# Patient Record
Sex: MALE | Race: WHITE | NOT HISPANIC OR LATINO
[De-identification: names, ages, dates, MRNs, and addresses within clinical notes are randomized per-mention and may not be internally consistent; named-entity substitution may affect disease eponyms.]

---

## 2019-02-28 ENCOUNTER — APPOINTMENT (OUTPATIENT)
Dept: HEART AND VASCULAR | Facility: CLINIC | Age: 59
End: 2019-02-28

## 2019-03-01 VITALS
RESPIRATION RATE: 16 BRPM | WEIGHT: 216.93 LBS | HEART RATE: 57 BPM | SYSTOLIC BLOOD PRESSURE: 117 MMHG | HEIGHT: 70 IN | OXYGEN SATURATION: 96 % | DIASTOLIC BLOOD PRESSURE: 75 MMHG

## 2019-03-01 RX ORDER — UBIDECARENONE 100 MG
1 CAPSULE ORAL
Qty: 0 | Refills: 0 | COMMUNITY

## 2019-03-01 RX ORDER — CHOLECALCIFEROL (VITAMIN D3) 125 MCG
1 CAPSULE ORAL
Qty: 0 | Refills: 0 | COMMUNITY

## 2019-03-01 RX ORDER — ROSUVASTATIN CALCIUM 5 MG/1
1 TABLET ORAL
Qty: 0 | Refills: 0 | COMMUNITY

## 2019-03-01 RX ORDER — ASPIRIN/CALCIUM CARB/MAGNESIUM 324 MG
1 TABLET ORAL
Qty: 0 | Refills: 0 | COMMUNITY

## 2019-03-01 NOTE — H&P ADULT - ASSESSMENT
60 y/o active M w/ PMHX HLD, Possible h/o MVP, who presented to their cardiologist for routine evaluation, notes ESPARZA on climbing 10 flights of stairs (CCS Angina Equivalent Class 2 Sx). 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 Echo in 10/2017 showed EF > 52%, ascending aorta 3.9cm, (4.8cm from sinuses), mild MR, trace TR. NST in 2/2019 showed proximal and mid anterior wall moderate-sized area of mild ischemia, EF normal. Pt denies CP, dizziness, diaphoresis, palpitations, LE edema, orthopnea, PND, syncope. Due to pts risk factors, CCS Angina Equivalent Class 2 Sx, abnormal NST / calcium score, pt is referred for cardiac catheterization w/ possible intervention.     Pt's last dose of Aspirin 81mg daily was on 3/3/19. ASA III. Mallampati III.

## 2019-03-01 NOTE — H&P ADULT - HISTORY OF PRESENT ILLNESS
58 y/o active M w/ PMHX HLD, Possible h/o MVP, who presented to their cardiologist for routine evaluation, notes ESPARZA on climbing 10 flights of stairs (CCS Angina Equivalent Class 2 Sx). 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 Echo in 10/2017 showed EF > 52%, ascending aorta 3.9cm, (4.8cm from sinuses), mild MR, trace TR. NST in 2/2019 showed proximal and mid anterior wall moderate-sized area of mild ischemia, EF normal. Pt denies CP, dizziness, diaphoresis, palpitations, LE edema, orthopnea, PND, syncope    Due to pts risk factors, CCS Angina Equivalent Class 2 Sx, abnormal NST / calcium score, pt is referred for cardiac catheterization w/ possible intervention. 60 y/o active M w/ PMHX HLD, Possible h/o MVP, who presented to their cardiologist for routine evaluation, notes ESPARZA on climbing 10 flights of stairs (CCS Angina Equivalent Class 2 Sx). 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 Echo in 10/2017 showed EF > 52%, ascending aorta 3.9cm, (4.8cm from sinuses), mild MR, trace TR. NST in 2/2019 showed proximal and mid anterior wall moderate-sized area of mild ischemia, EF normal. Pt denies CP, dizziness, diaphoresis, palpitations, LE edema, orthopnea, PND, syncope.    Due to pts risk factors, CCS Angina Equivalent Class 2 Sx, abnormal NST / calcium score, pt is referred for cardiac catheterization w/ possible intervention.

## 2019-03-01 NOTE — H&P ADULT - NEGATIVE CARDIOVASCULAR SYMPTOMS
no palpitations/no chest pain/no orthopnea/no paroxysmal nocturnal dyspnea/no peripheral edema/no claudication

## 2019-03-04 ENCOUNTER — OUTPATIENT (OUTPATIENT)
Dept: OUTPATIENT SERVICES | Facility: HOSPITAL | Age: 59
LOS: 1 days | Discharge: HOME | End: 2019-03-04
Payer: COMMERCIAL

## 2019-03-04 DIAGNOSIS — Z98.890 OTHER SPECIFIED POSTPROCEDURAL STATES: Chronic | ICD-10-CM

## 2019-03-04 LAB
ALBUMIN SERPL ELPH-MCNC: 4.7 G/DL — SIGNIFICANT CHANGE UP (ref 3.3–5)
ALP SERPL-CCNC: 59 U/L — SIGNIFICANT CHANGE UP (ref 40–120)
ALT FLD-CCNC: 25 U/L — SIGNIFICANT CHANGE UP (ref 10–45)
ANION GAP SERPL CALC-SCNC: 16 MMOL/L — SIGNIFICANT CHANGE UP (ref 5–17)
APTT BLD: 30.8 SEC — SIGNIFICANT CHANGE UP (ref 27.5–36.3)
AST SERPL-CCNC: 21 U/L — SIGNIFICANT CHANGE UP (ref 10–40)
BASOPHILS # BLD AUTO: 0.02 K/UL — SIGNIFICANT CHANGE UP (ref 0–0.2)
BASOPHILS NFR BLD AUTO: 0.5 % — SIGNIFICANT CHANGE UP (ref 0–2)
BILIRUB SERPL-MCNC: 0.5 MG/DL — SIGNIFICANT CHANGE UP (ref 0.2–1.2)
BUN SERPL-MCNC: 19 MG/DL — SIGNIFICANT CHANGE UP (ref 7–23)
CALCIUM SERPL-MCNC: 9.6 MG/DL — SIGNIFICANT CHANGE UP (ref 8.4–10.5)
CHLORIDE SERPL-SCNC: 106 MMOL/L — SIGNIFICANT CHANGE UP (ref 96–108)
CHOLEST SERPL-MCNC: 141 MG/DL — SIGNIFICANT CHANGE UP (ref 10–199)
CK MB CFR SERPL CALC: 1.7 NG/ML — SIGNIFICANT CHANGE UP (ref 0–6.7)
CK SERPL-CCNC: 89 U/L — SIGNIFICANT CHANGE UP (ref 30–200)
CO2 SERPL-SCNC: 25 MMOL/L — SIGNIFICANT CHANGE UP (ref 22–31)
CREAT SERPL-MCNC: 1.04 MG/DL — SIGNIFICANT CHANGE UP (ref 0.5–1.3)
CRP SERPL-MCNC: 0.06 MG/DL — SIGNIFICANT CHANGE UP (ref 0–0.4)
EOSINOPHIL # BLD AUTO: 0.13 K/UL — SIGNIFICANT CHANGE UP (ref 0–0.5)
EOSINOPHIL NFR BLD AUTO: 3 % — SIGNIFICANT CHANGE UP (ref 0–6)
GLUCOSE SERPL-MCNC: 102 MG/DL — HIGH (ref 70–99)
HBA1C BLD-MCNC: 5.2 % — SIGNIFICANT CHANGE UP (ref 4–5.6)
HCT VFR BLD CALC: 41.5 % — SIGNIFICANT CHANGE UP (ref 39–50)
HDLC SERPL-MCNC: 54 MG/DL — SIGNIFICANT CHANGE UP
HGB BLD-MCNC: 14 G/DL — SIGNIFICANT CHANGE UP (ref 13–17)
IMM GRANULOCYTES NFR BLD AUTO: 0.2 % — SIGNIFICANT CHANGE UP (ref 0–1.5)
INR BLD: 1.06 — SIGNIFICANT CHANGE UP (ref 0.88–1.16)
LIPID PNL WITH DIRECT LDL SERPL: 75 MG/DL — SIGNIFICANT CHANGE UP
LYMPHOCYTES # BLD AUTO: 1.18 K/UL — SIGNIFICANT CHANGE UP (ref 1–3.3)
LYMPHOCYTES # BLD AUTO: 27.1 % — SIGNIFICANT CHANGE UP (ref 13–44)
MCHC RBC-ENTMCNC: 31.9 PG — SIGNIFICANT CHANGE UP (ref 27–34)
MCHC RBC-ENTMCNC: 33.7 GM/DL — SIGNIFICANT CHANGE UP (ref 32–36)
MCV RBC AUTO: 94.5 FL — SIGNIFICANT CHANGE UP (ref 80–100)
MONOCYTES # BLD AUTO: 0.45 K/UL — SIGNIFICANT CHANGE UP (ref 0–0.9)
MONOCYTES NFR BLD AUTO: 10.3 % — SIGNIFICANT CHANGE UP (ref 2–14)
NEUTROPHILS # BLD AUTO: 2.57 K/UL — SIGNIFICANT CHANGE UP (ref 1.8–7.4)
NEUTROPHILS NFR BLD AUTO: 58.9 % — SIGNIFICANT CHANGE UP (ref 43–77)
NRBC # BLD: 0 /100 WBCS — SIGNIFICANT CHANGE UP (ref 0–0)
PLATELET # BLD AUTO: 184 K/UL — SIGNIFICANT CHANGE UP (ref 150–400)
POTASSIUM SERPL-MCNC: 4.3 MMOL/L — SIGNIFICANT CHANGE UP (ref 3.5–5.3)
POTASSIUM SERPL-SCNC: 4.3 MMOL/L — SIGNIFICANT CHANGE UP (ref 3.5–5.3)
PROT SERPL-MCNC: 7.6 G/DL — SIGNIFICANT CHANGE UP (ref 6–8.3)
PROTHROM AB SERPL-ACNC: 12 SEC — SIGNIFICANT CHANGE UP (ref 10–12.9)
RBC # BLD: 4.39 M/UL — SIGNIFICANT CHANGE UP (ref 4.2–5.8)
RBC # FLD: 11.9 % — SIGNIFICANT CHANGE UP (ref 10.3–14.5)
SODIUM SERPL-SCNC: 147 MMOL/L — HIGH (ref 135–145)
TOTAL CHOLESTEROL/HDL RATIO MEASUREMENT: 2.6 RATIO — LOW (ref 3.4–9.6)
TRIGL SERPL-MCNC: 62 MG/DL — SIGNIFICANT CHANGE UP (ref 10–149)
TROPONIN T SERPL-MCNC: <0.01 NG/ML — SIGNIFICANT CHANGE UP (ref 0–0.01)
WBC # BLD: 4.36 K/UL — SIGNIFICANT CHANGE UP (ref 3.8–10.5)
WBC # FLD AUTO: 4.36 K/UL — SIGNIFICANT CHANGE UP (ref 3.8–10.5)

## 2019-03-04 PROCEDURE — 93005 ELECTROCARDIOGRAM TRACING: CPT

## 2019-03-04 PROCEDURE — 85730 THROMBOPLASTIN TIME PARTIAL: CPT

## 2019-03-04 PROCEDURE — 93458 L HRT ARTERY/VENTRICLE ANGIO: CPT

## 2019-03-04 PROCEDURE — C1887: CPT

## 2019-03-04 PROCEDURE — 85025 COMPLETE CBC W/AUTO DIFF WBC: CPT

## 2019-03-04 PROCEDURE — 80061 LIPID PANEL: CPT

## 2019-03-04 PROCEDURE — 83036 HEMOGLOBIN GLYCOSYLATED A1C: CPT

## 2019-03-04 PROCEDURE — 93458 L HRT ARTERY/VENTRICLE ANGIO: CPT | Mod: 26

## 2019-03-04 PROCEDURE — 82553 CREATINE MB FRACTION: CPT

## 2019-03-04 PROCEDURE — C1894: CPT

## 2019-03-04 PROCEDURE — 84484 ASSAY OF TROPONIN QUANT: CPT

## 2019-03-04 PROCEDURE — 80053 COMPREHEN METABOLIC PANEL: CPT

## 2019-03-04 PROCEDURE — 82550 ASSAY OF CK (CPK): CPT

## 2019-03-04 PROCEDURE — 93010 ELECTROCARDIOGRAM REPORT: CPT

## 2019-03-04 PROCEDURE — 85610 PROTHROMBIN TIME: CPT

## 2019-03-04 PROCEDURE — C1769: CPT

## 2019-03-04 PROCEDURE — 86140 C-REACTIVE PROTEIN: CPT

## 2019-03-04 RX ORDER — ASPIRIN/CALCIUM CARB/MAGNESIUM 324 MG
325 TABLET ORAL ONCE
Qty: 0 | Refills: 0 | Status: COMPLETED | OUTPATIENT
Start: 2019-03-04 | End: 2019-03-04

## 2019-03-04 RX ORDER — SODIUM CHLORIDE 9 MG/ML
500 INJECTION INTRAMUSCULAR; INTRAVENOUS; SUBCUTANEOUS
Qty: 0 | Refills: 0 | Status: DISCONTINUED | OUTPATIENT
Start: 2019-03-04 | End: 2019-03-04

## 2019-03-04 RX ORDER — SODIUM CHLORIDE 9 MG/ML
500 INJECTION INTRAMUSCULAR; INTRAVENOUS; SUBCUTANEOUS
Qty: 0 | Refills: 0 | Status: DISCONTINUED | OUTPATIENT
Start: 2019-03-04 | End: 2019-03-19

## 2019-03-04 RX ORDER — CLOPIDOGREL BISULFATE 75 MG/1
600 TABLET, FILM COATED ORAL ONCE
Qty: 0 | Refills: 0 | Status: COMPLETED | OUTPATIENT
Start: 2019-03-04 | End: 2019-03-04

## 2019-03-04 RX ADMIN — CLOPIDOGREL BISULFATE 600 MILLIGRAM(S): 75 TABLET, FILM COATED ORAL at 09:38

## 2019-03-04 RX ADMIN — Medication 325 MILLIGRAM(S): at 09:38

## 2019-03-04 RX ADMIN — SODIUM CHLORIDE 75 MILLILITER(S): 9 INJECTION INTRAMUSCULAR; INTRAVENOUS; SUBCUTANEOUS at 09:38

## 2019-03-04 NOTE — PROGRESS NOTE ADULT - SUBJECTIVE AND OBJECTIVE BOX
Procedure: LHC, Radial band  Indication: UA, CAD, +EST  Complication: none    Result:  1) Non-obstructive CAD  2) Normal LV function, EF 65%, LVEDP 20        Plan: Medical management. D/C home - to f/u with Dr. David Mabry.

## 2019-03-04 NOTE — PROGRESS NOTE ADULT - SUBJECTIVE AND OBJECTIVE BOX
Interventional Cardiology PA SDA Discharge Note    No complaints.   Afebrile, VSS    Ext:  R Radial:   no hematoma, no bleeding, radial pulse 1+, radial band in place    A/P:  60 y/o M s/p cardiac cath today revealing non-obstructive CAD, normal EF    Continue ASA/Statin.     Stable for discharge as per attending Dr. Lazo after bed rest, pt voids, wrist remains stable, and 30 min. after ambulation.  Follow-up with Cardiologist Dr. David Mabry in 1 week  Discharge forms signed and copies in chart

## 2019-03-06 DIAGNOSIS — R94.39 ABNORMAL RESULT OF OTHER CARDIOVASCULAR FUNCTION STUDY: ICD-10-CM

## 2019-03-06 DIAGNOSIS — I25.110 ATHEROSCLEROTIC HEART DISEASE OF NATIVE CORONARY ARTERY WITH UNSTABLE ANGINA PECTORIS: ICD-10-CM

## 2019-03-06 DIAGNOSIS — E66.9 OBESITY, UNSPECIFIED: ICD-10-CM

## 2019-03-06 DIAGNOSIS — K21.9 GASTRO-ESOPHAGEAL REFLUX DISEASE WITHOUT ESOPHAGITIS: ICD-10-CM

## 2019-03-06 DIAGNOSIS — E78.5 HYPERLIPIDEMIA, UNSPECIFIED: ICD-10-CM

## 2019-03-06 DIAGNOSIS — Z88.0 ALLERGY STATUS TO PENICILLIN: ICD-10-CM

## 2019-03-18 ENCOUNTER — RECORD ABSTRACTING (OUTPATIENT)
Age: 59
End: 2019-03-18

## 2019-03-18 ENCOUNTER — APPOINTMENT (OUTPATIENT)
Dept: HEART AND VASCULAR | Facility: CLINIC | Age: 59
End: 2019-03-18
Payer: COMMERCIAL

## 2019-03-18 VITALS
BODY MASS INDEX: 32.43 KG/M2 | DIASTOLIC BLOOD PRESSURE: 80 MMHG | SYSTOLIC BLOOD PRESSURE: 116 MMHG | HEART RATE: 68 BPM | RESPIRATION RATE: 16 BRPM | WEIGHT: 224 LBS | HEIGHT: 69.5 IN

## 2019-03-18 DIAGNOSIS — I51.7 CARDIOMEGALY: ICD-10-CM

## 2019-03-18 DIAGNOSIS — Z86.39 PERSONAL HISTORY OF OTHER ENDOCRINE, NUTRITIONAL AND METABOLIC DISEASE: ICD-10-CM

## 2019-03-18 DIAGNOSIS — Z86.010 PERSONAL HISTORY OF COLONIC POLYPS: ICD-10-CM

## 2019-03-18 DIAGNOSIS — K21.9 GASTRO-ESOPHAGEAL REFLUX DISEASE W/OUT ESOPHAGITIS: ICD-10-CM

## 2019-03-18 DIAGNOSIS — Z82.3 FAMILY HISTORY OF STROKE: ICD-10-CM

## 2019-03-18 DIAGNOSIS — Z82.5 FAMILY HISTORY OF ASTHMA AND OTHER CHRONIC LOWER RESPIRATORY DISEASES: ICD-10-CM

## 2019-03-18 DIAGNOSIS — Z00.00 ENCOUNTER FOR GENERAL ADULT MEDICAL EXAMINATION W/OUT ABNORMAL FINDINGS: ICD-10-CM

## 2019-03-18 DIAGNOSIS — S83.249A OTHER TEAR OF MEDIAL MENISCUS, CURRENT INJURY, UNSPECIFIED KNEE, INITIAL ENCOUNTER: ICD-10-CM

## 2019-03-18 DIAGNOSIS — Z78.9 OTHER SPECIFIED HEALTH STATUS: ICD-10-CM

## 2019-03-18 PROCEDURE — 99204 OFFICE O/P NEW MOD 45 MIN: CPT

## 2019-03-18 RX ORDER — SILDENAFIL 20 MG/1
20 TABLET ORAL
Refills: 0 | Status: ACTIVE | COMMUNITY

## 2019-03-18 RX ORDER — MULTIVITAMIN
TABLET ORAL
Refills: 0 | Status: ACTIVE | COMMUNITY

## 2019-03-18 RX ORDER — UBIDECARENONE/VIT E ACET 100MG-5
50 MCG CAPSULE ORAL
Refills: 0 | Status: ACTIVE | COMMUNITY

## 2019-03-18 RX ORDER — ROSUVASTATIN CALCIUM 5 MG/1
5 TABLET, FILM COATED ORAL
Qty: 30 | Refills: 11 | Status: DISCONTINUED | COMMUNITY
End: 2019-03-18

## 2019-03-18 NOTE — PHYSICAL EXAM
[General Appearance - Well Developed] : well developed [Normal Appearance] : normal appearance [Well Groomed] : well groomed [General Appearance - Well Nourished] : well nourished [No Deformities] : no deformities [Normal Conjunctiva] : the conjunctiva exhibited no abnormalities [General Appearance - In No Acute Distress] : no acute distress [Eyelids - No Xanthelasma] : the eyelids demonstrated no xanthelasmas [Normal Oral Mucosa] : normal oral mucosa [No Oral Pallor] : no oral pallor [No Oral Cyanosis] : no oral cyanosis [Normal Jugular Venous A Waves Present] : normal jugular venous A waves present [Normal Jugular Venous V Waves Present] : normal jugular venous V waves present [No Jugular Venous Klein A Waves] : no jugular venous klein A waves [Heart Rate And Rhythm] : heart rate and rhythm were normal [Heart Sounds] : normal S1 and S2 [Murmurs] : no murmurs present [Respiration, Rhythm And Depth] : normal respiratory rhythm and effort [Exaggerated Use Of Accessory Muscles For Inspiration] : no accessory muscle use [Auscultation Breath Sounds / Voice Sounds] : lungs were clear to auscultation bilaterally [Abdomen Soft] : soft [Abdomen Tenderness] : non-tender [Abdomen Mass (___ Cm)] : no abdominal mass palpated [Abnormal Walk] : normal gait [Gait - Sufficient For Exercise Testing] : the gait was sufficient for exercise testing [Nail Clubbing] : no clubbing of the fingernails [Cyanosis, Localized] : no localized cyanosis [Petechial Hemorrhages (___cm)] : no petechial hemorrhages [Skin Color & Pigmentation] : normal skin color and pigmentation [] : no rash [No Venous Stasis] : no venous stasis [Skin Lesions] : no skin lesions [No Skin Ulcers] : no skin ulcer [No Xanthoma] : no  xanthoma was observed

## 2019-03-18 NOTE — DISCUSSION/SUMMARY
[Coronary Artery Disease] : coronary artery disease [Dietary Modification] : dietary modification [Exercise Regimen] : an exercise regimen [Hyperlipidemia] : hyperlipidemia [Diet Modification] : diet modification [Exercise] : exercise [Mild Mitral Regurgitation] : mild mitral regurgitation [Compensated] : compensated [Sodium Restriction] : sodium restriction [Stable] : stable [None] : none [Exercise Rehab] : exercise rehabilitation [Patient] : the patient [de-identified] : TAA 4.1 cm

## 2019-03-18 NOTE — HISTORY OF PRESENT ILLNESS
[FreeTextEntry1] : Juwan Cheung is a 58 yo male who presents for CV evaluation.  In Feb 2019, he was sent for EBCT which revealed elevated calcium score and mild dilatation of the thoracic aorta.  He was sent for stress perfusion study interpreted to be abnl.  He was scheduled to undergo cath but insurance was an issue.  He was referred to me.  Records were reviewed.  Cardiac cath took place at [facility].  Cath revealed nonobstructive disease.\par \par His recovery has been unremarkable.  Results were reviewed in detail.\par \par He denies cp, sob, pnd, orthopnea, edema, palp, or loc.\par \par He is active and compliant with meds.\par \par Meds reviewed, Mediterranean diet discussed, and exercise encouraged.

## 2019-03-18 NOTE — REASON FOR VISIT
[Initial Evaluation] : an initial evaluation of [Coronary Artery Disease] : coronary artery disease [Hyperlipidemia] : hyperlipidemia [Mitral Regurgitation] : mitral regurgitation [Peripheral Vascular Disease] : peripheral vascular disease

## 2019-03-19 LAB
ALBUMIN SERPL ELPH-MCNC: 4.5 G/DL
ALP BLD-CCNC: 55 U/L
ALT SERPL-CCNC: 28 U/L
ANION GAP SERPL CALC-SCNC: 12 MMOL/L
AST SERPL-CCNC: 25 U/L
BASOPHILS # BLD AUTO: 0.03 K/UL
BASOPHILS NFR BLD AUTO: 0.5 %
BILIRUB SERPL-MCNC: 0.5 MG/DL
BUN SERPL-MCNC: 20 MG/DL
CALCIUM SERPL-MCNC: 10 MG/DL
CHLORIDE SERPL-SCNC: 105 MMOL/L
CHOLEST SERPL-MCNC: 166 MG/DL
CHOLEST/HDLC SERPL: 2.9 RATIO
CO2 SERPL-SCNC: 25 MMOL/L
CREAT SERPL-MCNC: 1.13 MG/DL
EOSINOPHIL # BLD AUTO: 0.16 K/UL
EOSINOPHIL NFR BLD AUTO: 2.5 %
GLUCOSE SERPL-MCNC: 97 MG/DL
HCT VFR BLD CALC: 44.9 %
HDLC SERPL-MCNC: 58 MG/DL
HGB BLD-MCNC: 14.3 G/DL
IMM GRANULOCYTES NFR BLD AUTO: 0.3 %
LDLC SERPL CALC-MCNC: 92 MG/DL
LYMPHOCYTES # BLD AUTO: 1.47 K/UL
LYMPHOCYTES NFR BLD AUTO: 23.4 %
MAN DIFF?: NORMAL
MCHC RBC-ENTMCNC: 30.5 PG
MCHC RBC-ENTMCNC: 31.8 GM/DL
MCV RBC AUTO: 95.7 FL
MONOCYTES # BLD AUTO: 0.47 K/UL
MONOCYTES NFR BLD AUTO: 7.5 %
NEUTROPHILS # BLD AUTO: 4.14 K/UL
NEUTROPHILS NFR BLD AUTO: 65.8 %
PLATELET # BLD AUTO: 220 K/UL
POTASSIUM SERPL-SCNC: 4.8 MMOL/L
PROT SERPL-MCNC: 7.7 G/DL
RBC # BLD: 4.69 M/UL
RBC # FLD: 12.5 %
SODIUM SERPL-SCNC: 142 MMOL/L
TRIGL SERPL-MCNC: 82 MG/DL
WBC # FLD AUTO: 6.29 K/UL

## 2019-03-20 ENCOUNTER — RESULT REVIEW (OUTPATIENT)
Age: 59
End: 2019-03-20

## 2019-03-21 ENCOUNTER — RESULT REVIEW (OUTPATIENT)
Age: 59
End: 2019-03-21

## 2019-06-27 ENCOUNTER — APPOINTMENT (OUTPATIENT)
Dept: HEART AND VASCULAR | Facility: CLINIC | Age: 59
End: 2019-06-27
Payer: COMMERCIAL

## 2019-06-27 ENCOUNTER — RESULT REVIEW (OUTPATIENT)
Age: 59
End: 2019-06-27

## 2019-06-27 VITALS
WEIGHT: 222 LBS | HEART RATE: 58 BPM | RESPIRATION RATE: 14 BRPM | SYSTOLIC BLOOD PRESSURE: 102 MMHG | BODY MASS INDEX: 32.14 KG/M2 | DIASTOLIC BLOOD PRESSURE: 68 MMHG | HEIGHT: 69.5 IN

## 2019-06-27 PROCEDURE — 99214 OFFICE O/P EST MOD 30 MIN: CPT

## 2019-06-27 NOTE — HISTORY OF PRESENT ILLNESS
[FreeTextEntry1] : Juwan Cheung returns for follow up.  He denies cp, sob, pnd, orthopnea, edema, palp, or loc.\par \par He is active and compliant with meds.\par \par Discussion regarding risk and benefits of aspirin therapy were reviewed.  He will continue to take aspirin.

## 2019-06-27 NOTE — DISCUSSION/SUMMARY
[Coronary Artery Disease] : coronary artery disease [Exercise Regimen] : an exercise regimen [Dietary Modification] : dietary modification [Hyperlipidemia] : hyperlipidemia [Diet Modification] : diet modification [Exercise] : exercise [Mild Mitral Regurgitation] : mild mitral regurgitation [Compensated] : compensated [Sodium Restriction] : sodium restriction [Stable] : stable [None] : none [Exercise Rehab] : exercise rehabilitation [Patient] : the patient [de-identified] : TAA 4.1 cm

## 2019-06-27 NOTE — REASON FOR VISIT
[Follow-Up - Clinic] : a clinic follow-up of [Coronary Artery Disease] : coronary artery disease [Hyperlipidemia] : hyperlipidemia [Mitral Regurgitation] : mitral regurgitation [Peripheral Vascular Disease] : peripheral vascular disease

## 2019-06-27 NOTE — PHYSICAL EXAM
[General Appearance - Well Developed] : well developed [Normal Appearance] : normal appearance [Well Groomed] : well groomed [General Appearance - Well Nourished] : well nourished [No Deformities] : no deformities [General Appearance - In No Acute Distress] : no acute distress [Normal Conjunctiva] : the conjunctiva exhibited no abnormalities [Eyelids - No Xanthelasma] : the eyelids demonstrated no xanthelasmas [Normal Oral Mucosa] : normal oral mucosa [No Oral Pallor] : no oral pallor [No Oral Cyanosis] : no oral cyanosis [Normal Jugular Venous A Waves Present] : normal jugular venous A waves present [Normal Jugular Venous V Waves Present] : normal jugular venous V waves present [No Jugular Venous Klein A Waves] : no jugular venous klein A waves [Respiration, Rhythm And Depth] : normal respiratory rhythm and effort [Exaggerated Use Of Accessory Muscles For Inspiration] : no accessory muscle use [Auscultation Breath Sounds / Voice Sounds] : lungs were clear to auscultation bilaterally [Heart Rate And Rhythm] : heart rate and rhythm were normal [Heart Sounds] : normal S1 and S2 [Murmurs] : no murmurs present [Abdomen Soft] : soft [Abdomen Tenderness] : non-tender [Abdomen Mass (___ Cm)] : no abdominal mass palpated [Abnormal Walk] : normal gait [Gait - Sufficient For Exercise Testing] : the gait was sufficient for exercise testing [Cyanosis, Localized] : no localized cyanosis [Nail Clubbing] : no clubbing of the fingernails [Petechial Hemorrhages (___cm)] : no petechial hemorrhages [] : no rash [Skin Color & Pigmentation] : normal skin color and pigmentation [No Venous Stasis] : no venous stasis [Skin Lesions] : no skin lesions [No Skin Ulcers] : no skin ulcer [No Xanthoma] : no  xanthoma was observed

## 2019-06-28 LAB
ALBUMIN SERPL ELPH-MCNC: 4.6 G/DL
ALP BLD-CCNC: 56 U/L
ALT SERPL-CCNC: 28 U/L
ANION GAP SERPL CALC-SCNC: 13 MMOL/L
AST SERPL-CCNC: 23 U/L
BASOPHILS # BLD AUTO: 0.02 K/UL
BASOPHILS NFR BLD AUTO: 0.3 %
BILIRUB SERPL-MCNC: 0.3 MG/DL
BUN SERPL-MCNC: 22 MG/DL
CALCIUM SERPL-MCNC: 9.9 MG/DL
CHLORIDE SERPL-SCNC: 105 MMOL/L
CHOLEST SERPL-MCNC: 160 MG/DL
CHOLEST/HDLC SERPL: 2.7 RATIO
CO2 SERPL-SCNC: 24 MMOL/L
CREAT SERPL-MCNC: 1.04 MG/DL
EOSINOPHIL # BLD AUTO: 0.2 K/UL
EOSINOPHIL NFR BLD AUTO: 3.5 %
GLUCOSE SERPL-MCNC: 101 MG/DL
HCT VFR BLD CALC: 45.5 %
HDLC SERPL-MCNC: 59 MG/DL
HGB BLD-MCNC: 14.1 G/DL
IMM GRANULOCYTES NFR BLD AUTO: 0.5 %
LDLC SERPL CALC-MCNC: 91 MG/DL
LYMPHOCYTES # BLD AUTO: 1.57 K/UL
LYMPHOCYTES NFR BLD AUTO: 27.3 %
MAN DIFF?: NORMAL
MCHC RBC-ENTMCNC: 30.9 PG
MCHC RBC-ENTMCNC: 31 GM/DL
MCV RBC AUTO: 99.6 FL
MONOCYTES # BLD AUTO: 0.53 K/UL
MONOCYTES NFR BLD AUTO: 9.2 %
NEUTROPHILS # BLD AUTO: 3.41 K/UL
NEUTROPHILS NFR BLD AUTO: 59.2 %
PLATELET # BLD AUTO: 196 K/UL
POTASSIUM SERPL-SCNC: 4.6 MMOL/L
PROT SERPL-MCNC: 7.6 G/DL
RBC # BLD: 4.57 M/UL
RBC # FLD: 13.2 %
SODIUM SERPL-SCNC: 142 MMOL/L
TRIGL SERPL-MCNC: 51 MG/DL
WBC # FLD AUTO: 5.76 K/UL

## 2019-09-06 ENCOUNTER — APPOINTMENT (OUTPATIENT)
Dept: ORTHOPEDIC SURGERY | Facility: CLINIC | Age: 59
End: 2019-09-06
Payer: COMMERCIAL

## 2019-09-06 VITALS — WEIGHT: 218 LBS | BODY MASS INDEX: 31.21 KG/M2 | HEIGHT: 70 IN

## 2019-09-06 DIAGNOSIS — Z86.39 PERSONAL HISTORY OF OTHER ENDOCRINE, NUTRITIONAL AND METABOLIC DISEASE: ICD-10-CM

## 2019-09-06 DIAGNOSIS — Z87.39 PERSONAL HISTORY OF OTHER DISEASES OF THE MUSCULOSKELETAL SYSTEM AND CONNECTIVE TISSUE: ICD-10-CM

## 2019-09-06 DIAGNOSIS — Z80.3 FAMILY HISTORY OF MALIGNANT NEOPLASM OF BREAST: ICD-10-CM

## 2019-09-06 PROCEDURE — 99204 OFFICE O/P NEW MOD 45 MIN: CPT

## 2019-10-15 ENCOUNTER — APPOINTMENT (OUTPATIENT)
Dept: ORTHOPEDIC SURGERY | Facility: CLINIC | Age: 59
End: 2019-10-15
Payer: COMMERCIAL

## 2019-10-15 VITALS — WEIGHT: 218 LBS | HEIGHT: 70 IN | BODY MASS INDEX: 31.21 KG/M2

## 2019-10-15 DIAGNOSIS — M77.11 LATERAL EPICONDYLITIS, RIGHT ELBOW: ICD-10-CM

## 2019-10-15 PROCEDURE — 99212 OFFICE O/P EST SF 10 MIN: CPT

## 2020-01-16 ENCOUNTER — APPOINTMENT (OUTPATIENT)
Dept: HEART AND VASCULAR | Facility: CLINIC | Age: 60
End: 2020-01-16
Payer: COMMERCIAL

## 2020-01-16 VITALS
BODY MASS INDEX: 32.78 KG/M2 | DIASTOLIC BLOOD PRESSURE: 82 MMHG | HEIGHT: 70 IN | RESPIRATION RATE: 14 BRPM | SYSTOLIC BLOOD PRESSURE: 132 MMHG | WEIGHT: 229 LBS | HEART RATE: 54 BPM

## 2020-01-16 PROCEDURE — 99214 OFFICE O/P EST MOD 30 MIN: CPT

## 2020-01-16 NOTE — PHYSICAL EXAM
[General Appearance - Well Developed] : well developed [Normal Appearance] : normal appearance [Well Groomed] : well groomed [No Deformities] : no deformities [General Appearance - Well Nourished] : well nourished [General Appearance - In No Acute Distress] : no acute distress [Normal Conjunctiva] : the conjunctiva exhibited no abnormalities [Eyelids - No Xanthelasma] : the eyelids demonstrated no xanthelasmas [Normal Oral Mucosa] : normal oral mucosa [No Oral Pallor] : no oral pallor [No Oral Cyanosis] : no oral cyanosis [Normal Jugular Venous A Waves Present] : normal jugular venous A waves present [Normal Jugular Venous V Waves Present] : normal jugular venous V waves present [No Jugular Venous Klein A Waves] : no jugular venous klein A waves [Respiration, Rhythm And Depth] : normal respiratory rhythm and effort [Exaggerated Use Of Accessory Muscles For Inspiration] : no accessory muscle use [Auscultation Breath Sounds / Voice Sounds] : lungs were clear to auscultation bilaterally [Heart Rate And Rhythm] : heart rate and rhythm were normal [Murmurs] : no murmurs present [Heart Sounds] : normal S1 and S2 [Abdomen Soft] : soft [Abdomen Tenderness] : non-tender [Abdomen Mass (___ Cm)] : no abdominal mass palpated [Abnormal Walk] : normal gait [Gait - Sufficient For Exercise Testing] : the gait was sufficient for exercise testing [Cyanosis, Localized] : no localized cyanosis [Nail Clubbing] : no clubbing of the fingernails [Petechial Hemorrhages (___cm)] : no petechial hemorrhages [Skin Color & Pigmentation] : normal skin color and pigmentation [] : no rash [Skin Lesions] : no skin lesions [No Venous Stasis] : no venous stasis [No Skin Ulcers] : no skin ulcer [No Xanthoma] : no  xanthoma was observed

## 2020-01-16 NOTE — DISCUSSION/SUMMARY
[Coronary Artery Disease] : coronary artery disease [Dietary Modification] : dietary modification [Exercise Regimen] : an exercise regimen [Hyperlipidemia] : hyperlipidemia [Diet Modification] : diet modification [Exercise] : exercise [Compensated] : compensated [Mild Mitral Regurgitation] : mild mitral regurgitation [Sodium Restriction] : sodium restriction [Stable] : stable [None] : none [Exercise Rehab] : exercise rehabilitation [Patient] : the patient [de-identified] : TAA 4.1 cm

## 2020-01-16 NOTE — HISTORY OF PRESENT ILLNESS
[FreeTextEntry1] : Juwan Cheung returns for follow up.  He denies cp, sob, pnd, orthopnea, edema, palp, or loc.\par \par He has recovered from elbow strain.  He is exercising again.\par \par He is active and compliant with meds.\par \par .

## 2020-01-30 ENCOUNTER — TRANSCRIPTION ENCOUNTER (OUTPATIENT)
Age: 60
End: 2020-01-30

## 2020-06-08 ENCOUNTER — APPOINTMENT (OUTPATIENT)
Dept: HEART AND VASCULAR | Facility: CLINIC | Age: 60
End: 2020-06-08
Payer: COMMERCIAL

## 2020-06-08 PROCEDURE — 94621 CARDIOPULM EXERCISE TESTING: CPT

## 2020-06-08 PROCEDURE — 94729 DIFFUSING CAPACITY: CPT

## 2020-06-08 PROCEDURE — 94727 GAS DIL/WSHOT DETER LNG VOL: CPT

## 2020-06-08 PROCEDURE — 94010 BREATHING CAPACITY TEST: CPT | Mod: 59

## 2020-06-11 ENCOUNTER — APPOINTMENT (OUTPATIENT)
Dept: HEART AND VASCULAR | Facility: CLINIC | Age: 60
End: 2020-06-11
Payer: COMMERCIAL

## 2020-06-11 VITALS
HEIGHT: 70 IN | OXYGEN SATURATION: 98 % | WEIGHT: 215 LBS | SYSTOLIC BLOOD PRESSURE: 104 MMHG | BODY MASS INDEX: 30.78 KG/M2 | HEART RATE: 53 BPM | DIASTOLIC BLOOD PRESSURE: 76 MMHG | TEMPERATURE: 98.2 F

## 2020-06-11 PROCEDURE — 93320 DOPPLER ECHO COMPLETE: CPT

## 2020-06-11 PROCEDURE — 93325 DOPPLER ECHO COLOR FLOW MAPG: CPT

## 2020-06-11 PROCEDURE — 93351 STRESS TTE COMPLETE: CPT

## 2020-06-18 ENCOUNTER — APPOINTMENT (OUTPATIENT)
Dept: HEART AND VASCULAR | Facility: CLINIC | Age: 60
End: 2020-06-18
Payer: COMMERCIAL

## 2020-06-18 VITALS
HEIGHT: 70 IN | WEIGHT: 209 LBS | BODY MASS INDEX: 29.92 KG/M2 | SYSTOLIC BLOOD PRESSURE: 100 MMHG | TEMPERATURE: 98.6 F | DIASTOLIC BLOOD PRESSURE: 76 MMHG | HEART RATE: 68 BPM | RESPIRATION RATE: 16 BRPM | OXYGEN SATURATION: 98 %

## 2020-06-18 PROCEDURE — 99215 OFFICE O/P EST HI 40 MIN: CPT

## 2020-06-18 RX ORDER — TADALAFIL 20 MG/1
20 TABLET, FILM COATED ORAL
Refills: 0 | Status: DISCONTINUED | COMMUNITY
End: 2020-06-18

## 2020-06-18 RX ORDER — ASPIRIN ENTERIC COATED TABLETS 81 MG 81 MG/1
81 TABLET, DELAYED RELEASE ORAL EVERY OTHER DAY
Refills: 0 | Status: ACTIVE | COMMUNITY

## 2020-06-18 NOTE — HISTORY OF PRESENT ILLNESS
[FreeTextEntry1] : Juwan Cheung returns for follow up.  He denies cp, sob, pnd, orthopnea, edema, palp, or loc.\par \par He remains active but is not exercising like he used to due to pandemic.  He is compliant with meds.\par \par EXSE 6/2020: nl lv sys fxn; indeterminant blank fxn; LVH; aortic root 4.2 cm; mild to mod MR; mild TR; 12:25 min Yuan; no ischemia by WM\par CPET 6/2020: ischemic myocardial dysfxn; 104% predicted peak VO2; good peak O2 pulse; low AT\par \par Reviewed results in detail.\par \par Recommendations:\par 1. continue CV meds\par 2. consider L arginine\par 3. consider ACE inh\par 4. exercise\par 5. follow up 6 months\par .

## 2020-06-18 NOTE — PHYSICAL EXAM
[General Appearance - Well Developed] : well developed [Normal Appearance] : normal appearance [Well Groomed] : well groomed [General Appearance - Well Nourished] : well nourished [No Deformities] : no deformities [Normal Conjunctiva] : the conjunctiva exhibited no abnormalities [General Appearance - In No Acute Distress] : no acute distress [Eyelids - No Xanthelasma] : the eyelids demonstrated no xanthelasmas [Normal Oral Mucosa] : normal oral mucosa [No Oral Pallor] : no oral pallor [No Oral Cyanosis] : no oral cyanosis [Normal Jugular Venous A Waves Present] : normal jugular venous A waves present [Normal Jugular Venous V Waves Present] : normal jugular venous V waves present [No Jugular Venous Klein A Waves] : no jugular venous klein A waves [Respiration, Rhythm And Depth] : normal respiratory rhythm and effort [Exaggerated Use Of Accessory Muscles For Inspiration] : no accessory muscle use [Auscultation Breath Sounds / Voice Sounds] : lungs were clear to auscultation bilaterally [Heart Rate And Rhythm] : heart rate and rhythm were normal [Heart Sounds] : normal S1 and S2 [FreeTextEntry1] : sys murmur [Abdomen Soft] : soft [Abdomen Tenderness] : non-tender [Abdomen Mass (___ Cm)] : no abdominal mass palpated [Abnormal Walk] : normal gait [Gait - Sufficient For Exercise Testing] : the gait was sufficient for exercise testing [Nail Clubbing] : no clubbing of the fingernails [Cyanosis, Localized] : no localized cyanosis [Petechial Hemorrhages (___cm)] : no petechial hemorrhages [Skin Color & Pigmentation] : normal skin color and pigmentation [] : no rash [No Venous Stasis] : no venous stasis [Skin Lesions] : no skin lesions [No Skin Ulcers] : no skin ulcer [No Xanthoma] : no  xanthoma was observed

## 2020-06-18 NOTE — DISCUSSION/SUMMARY
[Cardiomyopathy] : cardiomyopathy [Coronary Artery Disease] : coronary artery disease [Possible Cardiac Ischemia (Intermd Prob)] : possible cardiac ischemia (intermediate probability) [Dietary Modification] : dietary modification [Exercise Regimen] : an exercise regimen [Hyperlipidemia] : hyperlipidemia [Exercise] : exercise [Diet Modification] : diet modification [Moderate Mitral Regurgitation] : moderate mitral regurgitation [Mild Mitral Regurgitation] : mild mitral regurgitation [Left Ventricular Hypertrophy] : left ventricular hypertrophy [Compensated] : compensated [Sodium Restriction] : sodium restriction [Stable] : stable [None] : none [Exercise Rehab] : exercise rehabilitation [Patient] : the patient [de-identified] : LVH [de-identified] : TAA 4.2 cm [FreeTextEntry1] : TR - mild

## 2020-10-23 ENCOUNTER — RX RENEWAL (OUTPATIENT)
Age: 60
End: 2020-10-23

## 2020-10-27 ENCOUNTER — RESULT REVIEW (OUTPATIENT)
Age: 60
End: 2020-10-27

## 2020-10-27 ENCOUNTER — APPOINTMENT (OUTPATIENT)
Dept: HEMATOLOGY ONCOLOGY | Facility: CLINIC | Age: 60
End: 2020-10-27
Payer: COMMERCIAL

## 2020-10-27 VITALS
RESPIRATION RATE: 18 BRPM | TEMPERATURE: 97.8 F | OXYGEN SATURATION: 96 % | SYSTOLIC BLOOD PRESSURE: 130 MMHG | BODY MASS INDEX: 29.78 KG/M2 | DIASTOLIC BLOOD PRESSURE: 86 MMHG | WEIGHT: 208 LBS | HEART RATE: 77 BPM | HEIGHT: 70 IN

## 2020-10-27 PROCEDURE — 99072 ADDL SUPL MATRL&STAF TM PHE: CPT

## 2020-10-27 PROCEDURE — 99354: CPT

## 2020-10-27 PROCEDURE — 99205 OFFICE O/P NEW HI 60 MIN: CPT

## 2020-10-30 ENCOUNTER — NON-APPOINTMENT (OUTPATIENT)
Age: 60
End: 2020-10-30

## 2020-11-02 ENCOUNTER — APPOINTMENT (OUTPATIENT)
Dept: RADIATION ONCOLOGY | Facility: CLINIC | Age: 60
End: 2020-11-02
Payer: COMMERCIAL

## 2020-11-02 ENCOUNTER — APPOINTMENT (OUTPATIENT)
Dept: HEMATOLOGY ONCOLOGY | Facility: CLINIC | Age: 60
End: 2020-11-02
Payer: COMMERCIAL

## 2020-11-02 VITALS
RESPIRATION RATE: 16 BRPM | HEIGHT: 70 IN | WEIGHT: 208 LBS | DIASTOLIC BLOOD PRESSURE: 78 MMHG | SYSTOLIC BLOOD PRESSURE: 116 MMHG | TEMPERATURE: 97.2 F | OXYGEN SATURATION: 96 % | BODY MASS INDEX: 29.78 KG/M2 | HEART RATE: 74 BPM

## 2020-11-02 VITALS
HEART RATE: 79 BPM | RESPIRATION RATE: 18 BRPM | WEIGHT: 208 LBS | SYSTOLIC BLOOD PRESSURE: 120 MMHG | OXYGEN SATURATION: 95 % | BODY MASS INDEX: 29.78 KG/M2 | HEIGHT: 70 IN | DIASTOLIC BLOOD PRESSURE: 85 MMHG

## 2020-11-02 PROCEDURE — 99072 ADDL SUPL MATRL&STAF TM PHE: CPT

## 2020-11-02 PROCEDURE — 99215 OFFICE O/P EST HI 40 MIN: CPT

## 2020-11-02 PROCEDURE — 99205 OFFICE O/P NEW HI 60 MIN: CPT | Mod: 25

## 2020-11-02 PROCEDURE — 92511 NASOPHARYNGOSCOPY: CPT

## 2020-11-02 NOTE — ASSESSMENT
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 Poorly differentiated\par Clinical stage I ([id number])\par \par Discussed at length about the diagnosis, staging, prognosis and treatment options\par He has a p16 positive tumor which generally has better response to treatment, however is poorly differentiated\par Discussed about the treatment options including chemoradiation vs surgery. Given the staging, recommend cisplatin based chemoradiation\par He saw rad onc today\par I have reviewed the risks, benefits and side effects of chemotherapy with the patient. All questions were answered to satisfaction. Patient agrees to pursue the planned chemotherapy.\par Plan:\par Cisplatin 50 mg/m2 days 1,2; 21,22; 42,43\par PICC vs port discussed. He opts for PICC\par Referral to audiometry \par \par Anxiety\par Emotional counselling provided\par Xanax PRN\par Advised to resume and continue exercise and sports as much as tolerated. He played tennis and racquet ball\par \par Personal history of cancer\par Nonsmoker,social drinker\par Mother with breast cancer in her 70s, Maternal grandfather with lung cancer (heavy smoker)\par Genetic testing sent\par Follow result\par \par Office visit to be coordinated on the day he begins chemoradiation\par CBC, CMP, Magnesium, phosphorus\par

## 2020-11-02 NOTE — PHYSICAL EXAM
[Fully active, able to carry on all pre-disease performance without restriction] : Status 0 - Fully active, able to carry on all pre-disease performance without restriction [Normal] : normal appearance, no rash, nodules, vesicles, ulcers, erythema [de-identified] : Right base of tongue cystic mass. Palpable right cervical lymph node [de-identified] : Anxious tearful

## 2020-11-02 NOTE — ASSESSMENT
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 POorly differentiated\par Clinical stage [id number] (needs staging work up\par \par Discussed at length about the diagnosis, work up, staging, prognosis and treatment options\par He has a p16 positive tumor which generally has better response to treatment, however is poorly differentiated\par Needs staging work up including PET/CT\par Discussed about the treatment options including chemoradiation vs surgery. This will depend on the staging, but give the palpable right cervical lymph node, most likely he will need chemoradiation\par Refer to rad onc\par Will need referral to audiometry and possible speech/swallow\par \par Anxiety\par Tearful\par Emotional counselling provided\par Xanax PRN\par \par Follow up in 1 week with above\par No labs

## 2020-11-02 NOTE — PHYSICAL EXAM
[Fully active, able to carry on all pre-disease performance without restriction] : Status 0 - Fully active, able to carry on all pre-disease performance without restriction [Normal] : affect appropriate [de-identified] : Right base of tongue cystic mass. Palpable right cervical lymph node [de-identified] : Anxious tearful

## 2020-11-02 NOTE — VITALS
[Maximal Pain Intensity: 0/10] : 0/10 [Least Pain Intensity: 0/10] : 0/10 [100: Normal, no complaints, no evidence of disease.] : 100: Normal, no complaints, no evidence of disease. [Date: ____________] : Patient's last distress assessment performed on [unfilled]. [10 - Extreme Distress] : Distress Level: 10 [Referred Patient  to social work for follow-up] : Patient was referred to social work for follow-up

## 2020-11-02 NOTE — HISTORY OF PRESENT ILLNESS
[de-identified] : Mr. Juwan Cheung is 60 year old male  here for initial consultation for recent diagnosed of  poorly differentiated carcinoma of tongue\par \par He has PMHx of thoracic aortic aneurysm,  GERD, non-smoker who initially presented with persistent constant phlegm for years that patient always had to clear his throat here and there but  phlegm started to get stuck in his throat over the last couple of months. Patient followed up with doctors who diagnosed with reflux. \par He saw  Dr. Germán Novak on 9/11/20 who started patient on Nexium  after fiberoptic exam. Symptoms did not improved and new fiberoptic exam 10/9/20 showed lingual tonsillar midline still very enlarged but less edema and erythema with large cyst measuring 3 cm in greatest dimension. \par \par 10/15/20 CT of neck - mass measuring 2.5 x 1.7 x2.4 cm the midline vallecular that is about the same density as his lingual tonsil tissue.  Multiple enlarged right level 2 Cervical LNs.\par 10/22/20 -  Patient was then had CT of neck and  2nd Microlaryngoscopy with biopsy of epiglottis/vallecular mass\par -POORLY DIFERRENTIATED CARCINOMA , stains strongly positive for pancytokeratin, p63, CK5/6, and P16. weakly positive for cytokeratin 7.\par \par Mother with breast cancer in her 70s, Maternal grandfather with lung cancer (heavy smoker)\par Nonsmoker,social drinker\par \par Patient reports of doing well, still has phlegm.\par No fevers chills night sweats\par No fatigue, tiredness, apathy\par No appetite loss or weight loss/weight gain\par No chest pain, shortness of breath, palpitation, dizziness\par No abdominal pain, nausea, vomiting, diarrhea, constipation\par No bright red blood per rectum, hematemesis or melena\par No hematuria, dysuria, frequency, urgency\par No headaches, visual changes, focal weakness, tingling, numbness\par

## 2020-11-03 ENCOUNTER — RESULT REVIEW (OUTPATIENT)
Age: 60
End: 2020-11-03

## 2020-11-03 NOTE — HISTORY OF PRESENT ILLNESS
[FreeTextEntry1] : Mr. Cheung is a 60 year old male recently diagnosed with a Stage I T2N1 p16+ Base of Tongue  poorly differentiated carcinoma of the base of tongue, referred to our office for a consultation for concurrent chemoradiation therapy. \par \par Mr. Cheung presented with a chief complaint of constant phlegm for many years, worsening in the last couple months. On 9/11/20, he was further evaluated with a fiberoptic/rhinoscopy exam demonstrated mildly deviated septum, lingual tonsils were enlarged at midline and they abutted the epiglottis on the lingual surface.  There were marked signs of reflux of the posterior glottic erythema and edema. He was started on Protonix, without any improvement post 1 month. \par \par Re-evaluation with fiberoptic exam on 10/9/20, reported improved signs of reflux but persistent very enlarged lingual tonsillar midline with less edema, with a large 3 cm cyst.  Further evaluation with CT NECK W/ IC (10/13/20) demonstrated a 2.5 x1.7 x2.4cm soft tissue mass in the right vallecula and multiple pathologically enlarged right level 2 cervical lymph nodes. \par \par On 10/20/20, he underwent a microlaryngoscopy with biopsy of the epiglottis/vallecular mass by ERLIN Rowland. Pathology revealed \par poorly differentiated carcinoma, stains were strongly positive for pancytokeratin, p63, CK5/6, and P16 and are focally weakly positive for cytokeratin 7. \par \par Staging PET/CT (10/31/20) demonstrated FDG activity at the base of tongue (2.6 cm, SUV 16.3), centered at the right base of tongue and crosses midline. There was FDG activity corresponding with the large right L2 lymph nodes, the largest 1.8 cm, with SUV 13.1. A small. mildly FDG avid right level 2b LN also present, measuring 1.5 cm with SUV 1.9. No other evidence of metastatic disease was demonstrated. \par \par Mr. Cheung reports weight loss (approximately 5 - 6 pounds) and decreased appetite since diagnosis, psychologically related to anxiety and worry. He reports he is otherwise, normally a good eater and very physically active. He has a family history of mother with breast cancer in her diagnosed in her late 70s, alive and well, and maternal grandfather, who had with lung cancer (heavy smoker). Mr. Cheung and his wife present  today to discuss the role of radiation therapy.\par \par He currently has an appointment with Dr. Chi, [place], and will need a dental evaluation before proceeding.

## 2020-11-03 NOTE — LETTER CLOSING
[Consult Closing:] : Thank you for allowing me to participate in the care of this patient.  If you have any questions, please do not hesitate to contact me. [Sincerely yours,] : Sincerely yours, [FreeTextEntry3] : Margaret Mccarthy MD\par

## 2020-11-03 NOTE — PROCEDURE
[Lesion] : lesion identified by mirror examination needing further evaluation [Topical Lidocaine] : topical lidocaine [Flexible Endoscope] : examined with the flexible endoscope [Photographs Taken] : photographs taken [Lesion(s)] : lesion(s) [Mass ___ cm] : [unfilled]Ucm mass on the base of the tongue [Normal] : the true vocal cords ~T were normal [de-identified] : 3 cm mass midline BOT

## 2020-11-03 NOTE — DISEASE MANAGEMENT
[Clinical] : TNM Stage: c [I] : I [FreeTextEntry4] : Squamous Cell Carcinoma of the Base of Tongue, p16+ [TTNM] : 2 [NTNM] : 1 [MTNM] : 0

## 2020-11-03 NOTE — PHYSICAL EXAM
[Outer Ear] : the ears and nose were normal in appearance [Normal Oral Cavity] : oral cavity was normal [Normal] : no focal deficits [Oriented To Time, Place, And Person] : oriented to person, place, and time [de-identified] : + right cervical adenopathy / fullness  [de-identified] : anxious

## 2020-11-04 ENCOUNTER — APPOINTMENT (OUTPATIENT)
Dept: OTOLARYNGOLOGY | Facility: CLINIC | Age: 60
End: 2020-11-04

## 2020-12-08 ENCOUNTER — RESULT REVIEW (OUTPATIENT)
Age: 60
End: 2020-12-08

## 2020-12-14 ENCOUNTER — APPOINTMENT (OUTPATIENT)
Dept: RADIATION ONCOLOGY | Facility: CLINIC | Age: 60
End: 2020-12-14
Payer: COMMERCIAL

## 2020-12-14 PROCEDURE — 99213 OFFICE O/P EST LOW 20 MIN: CPT | Mod: 95

## 2020-12-15 ENCOUNTER — NON-APPOINTMENT (OUTPATIENT)
Age: 60
End: 2020-12-15

## 2020-12-21 ENCOUNTER — RESULT REVIEW (OUTPATIENT)
Age: 60
End: 2020-12-21

## 2020-12-21 ENCOUNTER — APPOINTMENT (OUTPATIENT)
Dept: HEMATOLOGY ONCOLOGY | Facility: CLINIC | Age: 60
End: 2020-12-21
Payer: COMMERCIAL

## 2020-12-21 VITALS
WEIGHT: 199.9 LBS | HEIGHT: 70 IN | DIASTOLIC BLOOD PRESSURE: 86 MMHG | OXYGEN SATURATION: 98 % | TEMPERATURE: 97.3 F | RESPIRATION RATE: 18 BRPM | HEART RATE: 91 BPM | SYSTOLIC BLOOD PRESSURE: 123 MMHG | BODY MASS INDEX: 28.62 KG/M2

## 2020-12-21 PROCEDURE — 99072 ADDL SUPL MATRL&STAF TM PHE: CPT

## 2020-12-21 PROCEDURE — 99215 OFFICE O/P EST HI 40 MIN: CPT

## 2020-12-22 NOTE — HISTORY OF PRESENT ILLNESS
[de-identified] : Mr. Juwan Cheung is 60 year old male  here for initial consultation for recent diagnosed of  poorly differentiated carcinoma of tongue\par \par He has PMHx of thoracic aortic aneurysm,  GERD, non-smoker who initially presented with persistent constant phlegm for years that patient always had to clear his throat here and there but  phlegm started to get stuck in his throat over the last couple of months. Patient followed up with doctors who diagnosed with reflux. \par He saw  Dr. Germán Novak on 9/11/20 who started patient on Nexium  after fiberoptic exam. Symptoms did not improved and new fiberoptic exam 10/9/20 showed lingual tonsillar midline still very enlarged but less edema and erythema with large cyst measuring 3 cm in greatest dimension. \par \par 10/15/20 CT of neck - mass measuring 2.5 x 1.7 x2.4 cm the midline vallecular that is about the same density as his lingual tonsil tissue.  Multiple enlarged right level 2 Cervical LNs.\par 10/22/20 -  Patient was then had CT of neck and  2nd Microlaryngoscopy with biopsy of epiglottis/vallecular mass\par -POORLY DIFERRENTIATED CARCINOMA , stains strongly positive for pancytokeratin, p63, CK5/6, and P16. weakly positive for cytokeratin 7.\par \par Mother with breast cancer in her 70s, Maternal grandfather with lung cancer (heavy smoker)\par Nonsmoker,social drinker\par \par Patient reports of doing well, still has phlegm.\par No fevers chills night sweats\par No fatigue, tiredness, apathy\par No appetite loss or weight loss/weight gain\par No chest pain, shortness of breath, palpitation, dizziness\par No abdominal pain, nausea, vomiting, diarrhea, constipation\par No bright red blood per rectum, hematemesis or melena\par No hematuria, dysuria, frequency, urgency\par No headaches, visual changes, focal weakness, tingling, numbness\par \par Has seen Rad Onc.\par Has scheduled second opinion with Dr Jonathan Goldberg ([facility]), Dr Eben Chi ([facility])\par \par PET/CT (10/31/20)\par Base of tongue 2.6 cm SUV 16\par Right level 2 lymph node 1.8 cm x 1.2 cm SUV 13\par Right level 2b LN 1.5 cm, SUV 19\par No distant metastases\par \par  [de-identified] : He is seen today for follow up\par Accompanied by his wife via face time\par \par He had right base of tongue tumor resection with Dr Nunez at [facility] (12/9/20)\par Surgical pathology- 3 cm unifocal, HPV associated invasive squamous cell carcinoma, with lymphoepithelial feature. \par Margin was positive and 1 out the 5 positive lymph nodes had extranodal extension\par No lymphovascular or perineural invasion was seen. \par IHC: p16 positive. \par Pathological staging: pT2 N2. \par \par Mr. Cheung tolerated the procedure well and recovering well\par He is very anxious. Xanax helps\par \par

## 2020-12-22 NOTE — PHYSICAL EXAM
[Fully active, able to carry on all pre-disease performance without restriction] : Status 0 - Fully active, able to carry on all pre-disease performance without restriction [Normal] : grossly intact [de-identified] : Surgical scar right neck [de-identified] : Anxious tearful

## 2020-12-22 NOTE — ASSESSMENT
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 Poorly differentiated\par s/p right base of tongue tumor resection with Dr Nunez at [facility] (12/9/20)\par Pathological staging: pT2 N2. \par Positive margin and 1 out the 5 positive lymph nodes had extranodal extension\par \par Discussed at length about the diagnosis, staging, prognosis and treatment options\par He has a p16 positive tumor which generally has better response to treatment, one may consider deintensification of the treatment\par However his surgical path has both the high risk features including positive margin and extranodal extension\par I have explained that at this point there is no prospective evidence regarding best care for his p16 positive disease\par However overall, with the high risk features, he will need chemoradiation\par I have reviewed the risks, benefits and side effects of chemotherapy iincluding with the patient. All questions were answered to satisfaction. Patient agrees to pursue the planned chemotherapy.\par I have explained that we may be overtreating his cancer, but his priority is to maximize cure and he is willing to go ahead\par Plan:\par Cisplatin 50 mg/m2 days 1,2; 21,22; 42,43\par PICC vs port discussed. He opts for PICC\par Follow up with audiometry after every cisplatin dose\par \par His wife is a speech and swallow therapist and will monitor his oral intake. May need swallow consultation at some point\par \par Anxiety\par Emotional counselling provided\par Xanax PRN\par Advised to resume and continue exercise and sports as much as tolerated. He played tennis and racquet ball\par He also follows with the Well[facility] program at [facility]\par \par Personal history of cancer\par Nonsmoker,social drinker\par Mother with breast cancer in her 70s, Maternal grandfather with lung cancer (heavy smoker)\par Genetic testing sent\par Follow result\par \par Office visit to be coordinated on the day he begins chemoradiation\par CBC, CMP, Magnesium, phosphorus\par

## 2020-12-31 NOTE — DISEASE MANAGEMENT
[Pathological] : TNM Stage: p [II] : II [FreeTextEntry4] : Squamous Cell Carcinoma of the Base of Tongue, p16+ [TTNM] : 2 [NTNM] : 2 [MTNM] : 0

## 2020-12-31 NOTE — HISTORY OF PRESENT ILLNESS
[Medical Office: ([facility])___] : at the medical office located in  [Home] : at home, [unfilled] , at the time of the visit. [FreeTextEntry1] : \par Mr. Cheung is a 60 year old male, diagnosed with a clinical stage I , T2N1 p16+ base of tongue poorly differentiated carcinoma status post resection (pT2 N2 - Stage II). He presents today via telehealth to discuss adjuvant radiation therapy. \par \par Mr. Cheung presented with a chief complaint of constant phlegm for many years, worsening in the last couple months. On 9/11/20, he was further evaluated with a fiberoptic/rhinoscopy exam demonstrated mildly deviated septum, lingual tonsils were enlarged at midline and they abutted the epiglottis on the lingual surface.  There were marked signs of reflux of the posterior glottic erythema and edema. He was started on Protonix, without any improvement post 1 month. \par \par Re-evaluation with fiberoptic exam on 10/9/20, reported improved signs of reflux but persistent very enlarged lingual tonsillar midline with less edema, with a large 3 cm cyst.  Further evaluation with CT NECK W/ IC (10/13/20) demonstrated a 2.5 x1.7 x2.4cm soft tissue mass in the right vallecula and multiple pathologically enlarged right level 2 cervical lymph nodes. \par \par On 10/20/20, he underwent a microlaryngoscopy with biopsy of the epiglottis/vallecular mass by ERLIN Rowland. Pathology revealed \par poorly differentiated carcinoma, stains were strongly positive for pancytokeratin, p63, CK5/6, and P16 and were focally weakly positive for cytokeratin 7. \par \par Staging PET/CT (10/31/20) demonstrated FDG activity at the base of tongue (2.6 cm, SUV 16.3), centered at the right base of tongue and crossed midline. There was FDG activity corresponding with the large right L2 lymph nodes, the largest 1.8 cm, with SUV 13.1. A small. mildly FDG avid right level 2b LN was also present, measuring 1.5 cm with SUV 1.9. No other evidence of metastatic disease was demonstrated. \par \par On 12/9/20 Mr. Cheung underwent a right base of tongue tumor resection by performed by Dr. Chi. Surgical pathology was reviewed by [facility] and revealed a 3 cm unifocal, HPV associated invasive squamous cell carcinoma, with lymphoepithelial feature. There was tumor present at the true deep cauterized margin. No lymphovascular or perineural invasion was seen.  There was right neck involvement at level 2 and 3 with  5 / 28 lymph nodes positive for metastatic carcinoma, . IHC: p16 positive. Pathological staging: pT2 N2. Mr. Cheung tolerated the procedure well. \par \par Mr. Cheung and his wife present for a telehealth follow-up to further discuss the role of adjuvant radiation therapy post surgical resection.

## 2021-01-08 ENCOUNTER — RESULT REVIEW (OUTPATIENT)
Age: 61
End: 2021-01-08

## 2021-01-11 ENCOUNTER — RESULT REVIEW (OUTPATIENT)
Age: 61
End: 2021-01-11

## 2021-01-11 ENCOUNTER — NON-APPOINTMENT (OUTPATIENT)
Age: 61
End: 2021-01-11

## 2021-01-12 ENCOUNTER — RESULT REVIEW (OUTPATIENT)
Age: 61
End: 2021-01-12

## 2021-01-12 ENCOUNTER — APPOINTMENT (OUTPATIENT)
Dept: HEMATOLOGY ONCOLOGY | Facility: CLINIC | Age: 61
End: 2021-01-12
Payer: COMMERCIAL

## 2021-01-12 ENCOUNTER — NON-APPOINTMENT (OUTPATIENT)
Age: 61
End: 2021-01-12

## 2021-01-12 VITALS
SYSTOLIC BLOOD PRESSURE: 113 MMHG | OXYGEN SATURATION: 97 % | RESPIRATION RATE: 18 BRPM | BODY MASS INDEX: 28.49 KG/M2 | HEART RATE: 77 BPM | DIASTOLIC BLOOD PRESSURE: 77 MMHG | WEIGHT: 199 LBS | HEIGHT: 70 IN | TEMPERATURE: 96.6 F

## 2021-01-12 PROCEDURE — 99072 ADDL SUPL MATRL&STAF TM PHE: CPT

## 2021-01-12 PROCEDURE — 99215 OFFICE O/P EST HI 40 MIN: CPT

## 2021-01-12 NOTE — PHYSICAL EXAM
[Fully active, able to carry on all pre-disease performance without restriction] : Status 0 - Fully active, able to carry on all pre-disease performance without restriction [Normal] : affect appropriate [de-identified] : Surgical scar right neck [de-identified] : Anxious tearful

## 2021-01-12 NOTE — HISTORY OF PRESENT ILLNESS
[de-identified] : Mr. Juwan Cheung is 60 year old male  here for initial consultation for recent diagnosed of  poorly differentiated carcinoma of tongue\par \par He has PMHx of thoracic aortic aneurysm,  GERD, non-smoker who initially presented with persistent constant phlegm for years that patient always had to clear his throat here and there but  phlegm started to get stuck in his throat over the last couple of months. Patient followed up with doctors who diagnosed with reflux. \par He saw  Dr. Germán Novak on 9/11/20 who started patient on Nexium  after fiberoptic exam. Symptoms did not improved and new fiberoptic exam 10/9/20 showed lingual tonsillar midline still very enlarged but less edema and erythema with large cyst measuring 3 cm in greatest dimension. \par \par 10/15/20 CT of neck - mass measuring 2.5 x 1.7 x2.4 cm the midline vallecular that is about the same density as his lingual tonsil tissue.  Multiple enlarged right level 2 Cervical LNs.\par 10/22/20 -  Patient was then had CT of neck and  2nd Microlaryngoscopy with biopsy of epiglottis/vallecular mass\par -POORLY DIFERRENTIATED CARCINOMA , stains strongly positive for pancytokeratin, p63, CK5/6, and P16. weakly positive for cytokeratin 7.\par \par Mother with breast cancer in her 70s, Maternal grandfather with lung cancer (heavy smoker)\par Nonsmoker,social drinker\par \par Patient reports of doing well, still has phlegm.\par No fevers chills night sweats\par No fatigue, tiredness, apathy\par No appetite loss or weight loss/weight gain\par No chest pain, shortness of breath, palpitation, dizziness\par No abdominal pain, nausea, vomiting, diarrhea, constipation\par No bright red blood per rectum, hematemesis or melena\par No hematuria, dysuria, frequency, urgency\par No headaches, visual changes, focal weakness, tingling, numbness\par \par Has seen Rad Onc.\par Has scheduled second opinion with Dr Jonathan Goldberg ([facility]), Dr Eben Chi ([facility])\par \par PET/CT (10/31/20)\par Base of tongue 2.6 cm SUV 16\par Right level 2 lymph node 1.8 cm x 1.2 cm SUV 13\par Right level 2b LN 1.5 cm, SUV 19\par No distant metastases\par \par He had right base of tongue tumor resection with Dr Nunez at [facility] (12/9/20)\par Surgical pathology- 3 cm unifocal, HPV associated invasive squamous cell carcinoma, with lymphoepithelial feature. \par Margin was positive and 1 out the 5 positive lymph nodes had extranodal extension\par No lymphovascular or perineural invasion was seen. \par IHC: p16 positive. \par Pathological staging: pT2 N2.  [de-identified] : He is seen today for follow up and begin cisplatin (1/12/21 - present) \par \par He reports being very anxious about the entire process. \par He is now on lexapro and xanax. He is now following with Dr Vira Bentley\par He tried addreall. but did not tolerate it well\par \par \par \par

## 2021-01-12 NOTE — ASSESSMENT
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 Poorly differentiated\par s/p right base of tongue tumor resection with Dr Nunez at [facility] (12/9/20)\par Pathological staging: pT2 N2. \par Positive margin and 1 out the 5 positive lymph nodes had extranodal extension\par \par Here for C1 cisplatin (1/12/21)\par s/p PICC \par Started radiation earlier today\par Labs reviewed, analyzed and discussed\par I have reviewed the risks, benefits and side effects of chemotherapy with the patient. All questions were answered to satisfaction. Patient agrees to pursue the planned chemotherapy.\par Anxiety/depression- emotional counselling done\par Plan:\par Cisplatin 50 mg/m2 days 1,2; 21,22; 42,43\par Follow up with audiometry after every cisplatin dose\par \par Anxiety\par Emotional counselling provided\par Xanax PRN\par On lexapro\par Follows with Dr Vira Bentley (psych)\par \par Personal history of cancer\par Nonsmoker,social drinker\par Mother with breast cancer in her 70s, Maternal grandfather with lung cancer (heavy smoker)\par Genetic testing - [employer] 84 gene panel- negative\par \par Follow up weekly with Van except on the days of treatment\par CBC, CMP, magnesium, phosphorus

## 2021-01-13 ENCOUNTER — RX RENEWAL (OUTPATIENT)
Age: 61
End: 2021-01-13

## 2021-01-13 NOTE — HISTORY OF PRESENT ILLNESS
[FreeTextEntry1] : Mr. Cheung is present for OTV today. He will be undergoing V-SIM today and first treatment beginning tomorrow. He offered questions regarding his treatment dose and fractions that were answered. Possible side effects and skin care were reviewed. He underwent PICC line insertion today in preparation for his concurrent chemotherapy.

## 2021-01-13 NOTE — PHYSICAL EXAM
[Normal] : oriented to person, place and time, the affect was normal, the mood was normal and not anxious [de-identified] : incisions appear well healed

## 2021-01-13 NOTE — DISEASE MANAGEMENT
[Pathological] : TNM Stage: p [II] : II [TTNM] : 2 [NTNM] : 2 [MTNM] : 0 [de-identified] : 200 cGy [de-identified] : 6000 cGy [de-identified] : oropharynx/Neck

## 2021-01-15 ENCOUNTER — NON-APPOINTMENT (OUTPATIENT)
Age: 61
End: 2021-01-15

## 2021-01-19 ENCOUNTER — APPOINTMENT (OUTPATIENT)
Dept: HEMATOLOGY ONCOLOGY | Facility: CLINIC | Age: 61
End: 2021-01-19
Payer: COMMERCIAL

## 2021-01-19 ENCOUNTER — NON-APPOINTMENT (OUTPATIENT)
Age: 61
End: 2021-01-19

## 2021-01-19 ENCOUNTER — RESULT REVIEW (OUTPATIENT)
Age: 61
End: 2021-01-19

## 2021-01-19 VITALS
BODY MASS INDEX: 27.76 KG/M2 | TEMPERATURE: 98.1 F | RESPIRATION RATE: 16 BRPM | HEART RATE: 92 BPM | WEIGHT: 193.9 LBS | OXYGEN SATURATION: 97 % | SYSTOLIC BLOOD PRESSURE: 121 MMHG | HEIGHT: 70 IN | DIASTOLIC BLOOD PRESSURE: 82 MMHG

## 2021-01-19 VITALS
WEIGHT: 196 LBS | RESPIRATION RATE: 18 BRPM | BODY MASS INDEX: 28.06 KG/M2 | DIASTOLIC BLOOD PRESSURE: 90 MMHG | HEIGHT: 70 IN | SYSTOLIC BLOOD PRESSURE: 126 MMHG | OXYGEN SATURATION: 98 % | HEART RATE: 96 BPM

## 2021-01-19 DIAGNOSIS — R11.2 NAUSEA WITH VOMITING, UNSPECIFIED: ICD-10-CM

## 2021-01-19 DIAGNOSIS — T45.1X5A NAUSEA WITH VOMITING, UNSPECIFIED: ICD-10-CM

## 2021-01-19 PROCEDURE — 99072 ADDL SUPL MATRL&STAF TM PHE: CPT

## 2021-01-19 PROCEDURE — 99214 OFFICE O/P EST MOD 30 MIN: CPT

## 2021-01-20 PROBLEM — R11.2 CHEMOTHERAPY INDUCED NAUSEA AND VOMITING: Status: ACTIVE | Noted: 2021-01-12

## 2021-01-20 NOTE — HISTORY OF PRESENT ILLNESS
[FreeTextEntry1] : Mr. Cheung is status post fraction 5 / 33 today. He reports no significant changes at this time. He does notice some discomfort to the throat. He reports his appetite has decreased secondary to his generalized anxiety. His weight is 196 pounds, prior 199 lbs last week. He has been doing saltwater rinse several times per day. He reports his first cycles of chemotherapy came with side effects of hiccups and nausea. He reports he is currently on Lexapro and xanax for his anxiety and is under the care of Dr. Bentley.

## 2021-01-20 NOTE — ASSESSMENT
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 Poorly differentiated\par s/p right base of tongue tumor resection with Dr Nunez at [facility] (12/9/20)\par Pathological staging: pT2 N2. \par Positive margin and 1 out the 5 positive lymph nodes had extranodal extension\par \par # s/p  C1 cisplatin (1/12/21 - present) - here for follow up\par Radiation (1/11/21 - 2/23/21) - total 6000 cGy\par Labs reviewed, analyzed and discussed\par -post chemo hiccups and nausea - improved with Zofran 8mg - Advised to take Zofran on chemo days and post  next treatment. Patient cannot take Thorazine since it'll prolong QT interval when taking with Lexapro and patient understands.  \par -post chemo fatigue improved with Decadron 4mg \par \par Plan:\par Cisplatin 50 mg/m2 days 1,2; 21,22; 42,43\par Follow up with audiometry after every cisplatin dose\par \par #Anxiety\par Follows with Dr Vira Bentley (psych)\par -not improved with Lexapro 10mg and Xanax 0.5mg bid - advised to go up to Lexapro 20mg and follows up with Dr. Bentley. \par -Advised to see therapists as well and patient states he'll look into it. \par \par #constipation - increase hydration. Advised to do Miralaax and prune juice. \par \par #Personal history of cancer\par Nonsmoker,social drinker\par Mother with breast cancer in her 70s, Maternal grandfather with lung cancer (heavy smoker)\par Genetic testing - [employer] 84 gene panel- negative\par \par d/w Dr. Khan\par return next week for follow up\par CBC, CMP, magnesium, phosphorus

## 2021-01-20 NOTE — DISEASE MANAGEMENT
[Pathological] : TNM Stage: p [II] : II [TTNM] : 2 [NTNM] : 2 [MTNM] : 0 [de-identified] : 1000 cGy [de-identified] : 6000 cGy [de-identified] : oropharynx/Neck

## 2021-01-20 NOTE — PHYSICAL EXAM
[Normal] : oriented to person, place and time, the affect was normal, the mood was normal and not anxious [de-identified] : incisions appear well healed, no erythema. Dry mucous membranes

## 2021-01-20 NOTE — REVIEW OF SYSTEMS
[Dysphagia: Grade 0] : Dysphagia: Grade 0 [Fatigue: Grade 0] : Fatigue: Grade 0 [FreeTextEntry7] : very mild throat discomfort

## 2021-01-20 NOTE — HISTORY OF PRESENT ILLNESS
[de-identified] : Mr. Juwan Cheung is 60 year old male  here for initial consultation for recent diagnosed of  poorly differentiated carcinoma of tongue\par \par He has PMHx of thoracic aortic aneurysm,  GERD, non-smoker who initially presented with persistent constant phlegm for years that patient always had to clear his throat here and there but  phlegm started to get stuck in his throat over the last couple of months. Patient followed up with doctors who diagnosed with reflux. \par He saw  Dr. Germán Novak on 9/11/20 who started patient on Nexium  after fiberoptic exam. Symptoms did not improved and new fiberoptic exam 10/9/20 showed lingual tonsillar midline still very enlarged but less edema and erythema with large cyst measuring 3 cm in greatest dimension. \par \par 10/15/20 CT of neck - mass measuring 2.5 x 1.7 x2.4 cm the midline vallecular that is about the same density as his lingual tonsil tissue.  Multiple enlarged right level 2 Cervical LNs.\par 10/22/20 -  Patient was then had CT of neck and  2nd Microlaryngoscopy with biopsy of epiglottis/vallecular mass\par -POORLY DIFERRENTIATED CARCINOMA , stains strongly positive for pancytokeratin, p63, CK5/6, and P16. weakly positive for cytokeratin 7.\par \par Mother with breast cancer in her 70s, Maternal grandfather with lung cancer (heavy smoker)\par Nonsmoker,social drinker\par \par Patient reports of doing well, still has phlegm.\par No fevers chills night sweats\par No fatigue, tiredness, apathy\par No appetite loss or weight loss/weight gain\par No chest pain, shortness of breath, palpitation, dizziness\par No abdominal pain, nausea, vomiting, diarrhea, constipation\par No bright red blood per rectum, hematemesis or melena\par No hematuria, dysuria, frequency, urgency\par No headaches, visual changes, focal weakness, tingling, numbness\par \par Has seen Rad Onc.\par Has scheduled second opinion with Dr Jonathan Goldberg ([facility]), Dr Eben Chi ([facility])\par \par PET/CT (10/31/20)\par Base of tongue 2.6 cm SUV 16\par Right level 2 lymph node 1.8 cm x 1.2 cm SUV 13\par Right level 2b LN 1.5 cm, SUV 19\par No distant metastases\par \par He had right base of tongue tumor resection with Dr Nunez at [facility] (12/9/20)\par Surgical pathology- 3 cm unifocal, HPV associated invasive squamous cell carcinoma, with lymphoepithelial feature. \par Margin was positive and 1 out the 5 positive lymph nodes had extranodal extension\par No lymphovascular or perineural invasion was seen. \par IHC: p16 positive. \par Pathological staging: pT2 N2.  [de-identified] : He is seen today for follow up -s/p C1 Cisplatin (1/12/21 - present) \par Radiation - (1/11/2021 -presents)\par \par Patient reports of having extreme anxiety over his chemoradiation, not improved with Lexapro 10 mg and Xanax 0.5 mg bid. He wakes in the morning trembling and shaky. Patient also had severe hiccups and some nausea  post C1 treatment that lasted for 2 days, finally improved with Zofran 8 mg. Constipation not improved with Colace. Currently, he's still able to eat and pushing for PO intake, no recent weight loss. His post chemo fatigue improved slightly with 2 days of Decadron 4mg. \par \par Denies vomiting, headaches, dizziness, chest pain/palpitation, SOB/ESPARZA, fluid retention.\par \par 193lbs. \par \par \par

## 2021-01-20 NOTE — RESULTS/DATA
[FreeTextEntry1] : Labs, imaging, path reviewed, analyzed and discussed\par 1/19/20 wbc 8.0 hgb 14.4 hct 39.8 plts 173

## 2021-01-26 ENCOUNTER — RESULT REVIEW (OUTPATIENT)
Age: 61
End: 2021-01-26

## 2021-01-26 ENCOUNTER — APPOINTMENT (OUTPATIENT)
Dept: HEMATOLOGY ONCOLOGY | Facility: CLINIC | Age: 61
End: 2021-01-26
Payer: COMMERCIAL

## 2021-01-26 ENCOUNTER — NON-APPOINTMENT (OUTPATIENT)
Age: 61
End: 2021-01-26

## 2021-01-26 VITALS
HEART RATE: 74 BPM | DIASTOLIC BLOOD PRESSURE: 80 MMHG | WEIGHT: 198 LBS | SYSTOLIC BLOOD PRESSURE: 113 MMHG | HEIGHT: 70 IN | RESPIRATION RATE: 18 BRPM | BODY MASS INDEX: 28.35 KG/M2 | OXYGEN SATURATION: 99 %

## 2021-01-26 VITALS
WEIGHT: 195 LBS | SYSTOLIC BLOOD PRESSURE: 121 MMHG | DIASTOLIC BLOOD PRESSURE: 82 MMHG | RESPIRATION RATE: 18 BRPM | HEART RATE: 77 BPM | TEMPERATURE: 96.6 F | OXYGEN SATURATION: 96 % | BODY MASS INDEX: 27.92 KG/M2 | HEIGHT: 70 IN

## 2021-01-26 DIAGNOSIS — F32.9 MAJOR DEPRESSIVE DISORDER, SINGLE EPISODE, UNSPECIFIED: ICD-10-CM

## 2021-01-26 PROCEDURE — 99072 ADDL SUPL MATRL&STAF TM PHE: CPT

## 2021-01-26 PROCEDURE — 99214 OFFICE O/P EST MOD 30 MIN: CPT

## 2021-01-26 RX ORDER — LORAZEPAM 0.5 MG/1
0.5 TABLET ORAL
Qty: 30 | Refills: 0 | Status: COMPLETED | COMMUNITY
Start: 2020-12-28

## 2021-01-26 RX ORDER — CHLORPROMAZINE HYDROCHLORIDE 25 MG/1
25 TABLET, SUGAR COATED ORAL 3 TIMES DAILY
Qty: 30 | Refills: 0 | Status: DISCONTINUED | COMMUNITY
Start: 2021-01-14 | End: 2021-01-26

## 2021-01-26 NOTE — RESULTS/DATA
[FreeTextEntry1] : Labs, imaging, path reviewed, analyzed and discussed\par 1/26/21 wbc 6.6 hgb 12.8 hct 36.2 plts 140

## 2021-01-26 NOTE — ASSESSMENT
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 Poorly differentiated\par s/p right base of tongue tumor resection with Dr Nunez at [facility] (12/9/20)\par Pathological staging: pT2 N2. \par Positive margin and 1 out the 5 positive lymph nodes had extranodal extension\par \par # s/p  C1 cisplatin (1/12/21 - present) - here for follow up\par Radiation (1/11/21 - 2/23/21) - total 6000 cGy\par Labs reviewed, analyzed and discussed\par -hx of post chemo hiccups and nausea - improved with Zofran 8mg - . Patient cannot take Thorazine since it'll prolong QT interval when taking with Lexapro and patient understands.  \par -post chemo fatigue improved with Decadron 4mg \par \par Plan:\par Cisplatin 50 mg/m2 days 1,2; 21,22; 42,43\par Follow up with audiometry after every cisplatin dose\par \par #dysphagia - 2/2 radiation - continue with Magic mouthwash and Tramadol prn. To continue with pushing for PO intake. \par \par #Anxiety\par Follows with Dr Vira Bentley (psych)\par -not improved with Lexapro 10mg and Xanax 0.5mg bid - advised to go up to Lexapro 20mg and follows up with Dr. Bentley. \par -Advised to see therapists as well and patient states he'll look into it. \par \par #constipation - improved with Miralaax\par \par #Personal history of cancer\par Nonsmoker,social drinker\par Mother with breast cancer in her 70s, Maternal grandfather with lung cancer (heavy smoker)\par Genetic testing - [employer] 84 gene panel- negative\par \par d/w Dr. Khan\par return in 1 week for C2 treatment\par CBC, CMP, magnesium, phosphorus

## 2021-01-26 NOTE — HISTORY OF PRESENT ILLNESS
[de-identified] : Mr. Juwan Cheung is 60 year old male  here for initial consultation for recent diagnosed of  poorly differentiated carcinoma of tongue\par \par He has PMHx of thoracic aortic aneurysm,  GERD, non-smoker who initially presented with persistent constant phlegm for years that patient always had to clear his throat here and there but  phlegm started to get stuck in his throat over the last couple of months. Patient followed up with doctors who diagnosed with reflux. \par He saw  Dr. Germán Novak on 9/11/20 who started patient on Nexium  after fiberoptic exam. Symptoms did not improved and new fiberoptic exam 10/9/20 showed lingual tonsillar midline still very enlarged but less edema and erythema with large cyst measuring 3 cm in greatest dimension. \par \par 10/15/20 CT of neck - mass measuring 2.5 x 1.7 x2.4 cm the midline vallecular that is about the same density as his lingual tonsil tissue.  Multiple enlarged right level 2 Cervical LNs.\par 10/22/20 -  Patient was then had CT of neck and  2nd Microlaryngoscopy with biopsy of epiglottis/vallecular mass\par -POORLY DIFERRENTIATED CARCINOMA , stains strongly positive for pancytokeratin, p63, CK5/6, and P16. weakly positive for cytokeratin 7.\par \par Mother with breast cancer in her 70s, Maternal grandfather with lung cancer (heavy smoker)\par Nonsmoker,social drinker\par \par Patient reports of doing well, still has phlegm.\par No fevers chills night sweats\par No fatigue, tiredness, apathy\par No appetite loss or weight loss/weight gain\par No chest pain, shortness of breath, palpitation, dizziness\par No abdominal pain, nausea, vomiting, diarrhea, constipation\par No bright red blood per rectum, hematemesis or melena\par No hematuria, dysuria, frequency, urgency\par No headaches, visual changes, focal weakness, tingling, numbness\par \par Has seen Rad Onc.\par Has scheduled second opinion with Dr Jonathan Goldberg ([facility]), Dr Eben Chi ([facility])\par \par PET/CT (10/31/20)\par Base of tongue 2.6 cm SUV 16\par Right level 2 lymph node 1.8 cm x 1.2 cm SUV 13\par Right level 2b LN 1.5 cm, SUV 19\par No distant metastases\par \par He had right base of tongue tumor resection with Dr Nunez at [facility] (12/9/20)\par Surgical pathology- 3 cm unifocal, HPV associated invasive squamous cell carcinoma, with lymphoepithelial feature. \par Margin was positive and 1 out the 5 positive lymph nodes had extranodal extension\par No lymphovascular or perineural invasion was seen. \par IHC: p16 positive. \par Pathological staging: pT2 N2.  [de-identified] : He is seen today for follow up -s/p C1 Cisplatin (1/12/21 - present) \par Radiation - (1/11/2021 -presents)\par \par Patient states his dysphagia has slowly getting worse, now using magic mouthwash and awaiting for Tramadol prescription by Dr. Mccarthy. He's still trying hard to push for PO intake. His ringing of both ears have slowly dissipate, no hearing loss.  He's anxious about starting cycle 2 treatment due to post chemo hiccups and fatigue after C1 treatment. He's currently only taking Lexapro 10mg and Xanax 0.5mg bid. \par \par Denies vomiting, headaches, dizziness, chest pain/palpitation, SOB/ESPARZA, fluid retention.\par \par 193lbs -> 195lbs. \par \par \par

## 2021-01-27 NOTE — HISTORY OF PRESENT ILLNESS
[FreeTextEntry1] : Mr. Cheung offer no new symptoms today. He is status post fraction 11 / 33 of concurrent chemoradiation. MMW instructions reviewed for throat discomfort. Possible side effects in the coming weeks has been reviewed. Vital signs and weight is stable (198 lbs today). He is tolerating treatment well.  Will follow-up of baseline swallow evaluation appointment, he notes he developed some challenges swallowing thin liquids over the weekend.  He will f/u with Dr. Khan regarding the tinnitus. He will follow up with his psychiatrist regarding his lexapro dose.

## 2021-01-27 NOTE — DISEASE MANAGEMENT
[Pathological] : TNM Stage: p [II] : II [TTNM] : 2 [NTNM] : 2 [MTNM] : 0 [de-identified] : 2000 cGy [de-identified] : 6600 cGy [de-identified] : oropharynx/Neck

## 2021-01-27 NOTE — PHYSICAL EXAM
[Normal] : oriented to person, place and time, the affect was normal, the mood was normal and not anxious [de-identified] : No erythema. Dry mucous membranes

## 2021-01-30 ENCOUNTER — RESULT REVIEW (OUTPATIENT)
Age: 61
End: 2021-01-30

## 2021-02-02 ENCOUNTER — NON-APPOINTMENT (OUTPATIENT)
Age: 61
End: 2021-02-02

## 2021-02-02 ENCOUNTER — APPOINTMENT (OUTPATIENT)
Dept: HEMATOLOGY ONCOLOGY | Facility: CLINIC | Age: 61
End: 2021-02-02
Payer: COMMERCIAL

## 2021-02-02 ENCOUNTER — RESULT REVIEW (OUTPATIENT)
Age: 61
End: 2021-02-02

## 2021-02-02 VITALS
WEIGHT: 191 LBS | BODY MASS INDEX: 27.35 KG/M2 | DIASTOLIC BLOOD PRESSURE: 77 MMHG | RESPIRATION RATE: 18 BRPM | HEART RATE: 90 BPM | HEIGHT: 70 IN | TEMPERATURE: 97.1 F | OXYGEN SATURATION: 98 % | SYSTOLIC BLOOD PRESSURE: 121 MMHG

## 2021-02-02 VITALS
RESPIRATION RATE: 18 BRPM | HEART RATE: 88 BPM | WEIGHT: 191 LBS | BODY MASS INDEX: 27.35 KG/M2 | OXYGEN SATURATION: 98 % | HEIGHT: 70 IN | SYSTOLIC BLOOD PRESSURE: 123 MMHG | DIASTOLIC BLOOD PRESSURE: 71 MMHG

## 2021-02-02 PROCEDURE — 99072 ADDL SUPL MATRL&STAF TM PHE: CPT

## 2021-02-02 PROCEDURE — 99215 OFFICE O/P EST HI 40 MIN: CPT

## 2021-02-02 NOTE — ASSESSMENT
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 Poorly differentiated\par s/p right base of tongue tumor resection with Dr Nunez at [facility] (12/9/20)\par Pathological staging: pT2 N2. \par Positive margin and 1 out the 5 positive lymph nodes had extranodal extension\par \par Here for C1 cisplatin (1/12/21 - present) - here for follow up\par Radiation (1/11/21 - 2/23/21) - total 6000 cGy\par Labs reviewed, analyzed and discussed\par Agranulocytosis from chemotherapy and radiation- WBC 2.2, ANC>1. COntinue with the treatment. Advised to take GCSF Saturday night (if insurance approves outpatient injection). Otherwise he may need interruption in chemoradiaiton for GCSF x 3. Advised to monitor for fever and go to ED for any fever (T>100.2F\par - Hiccups- Patient cannot take Thorazine since it'll prolong QT interval when taking with Lexapro. Advised to take compazine 10 mg TID x 5 days post chemo,\par Nausea- Zofran PRN. Continue with decadron post chemo \par Tinnitus- likely from cisplatin. Explained that there is no medication to treat tinnitus per say. Can use xanax PRN. If happens again with this treatment, will have to consider dose reduction/discontinuation of cisplaitn. \par Plan:\par Cisplatin 50 mg/m2 days 1,2; 21,22; 42,43\par Follow up with audiometry after every cisplatin dose\par \par Dysphagia \par Combination of radiation and mucositis from neutropenia\par Ccontinue with Magic mouthwash and Tramadol PRN\par Hydration 1000 cc NS PRN\par \par Anxiety\par Follows with Dr Vira Bentley (psych)\par -not improved with Lexapro 10mg and Xanax 0.5mg bid - advised to go up to Lexapro 20mg and follows up with Dr. Bentley. \par -Advised to see therapists as well and patient states he'll look into it. \par \par #constipation - improved with Miralaax\par \par #Personal history of cancer\par Nonsmoker,social drinker\par Mother with breast cancer in her 70s, Maternal grandfather with lung cancer (heavy smoker)\par Genetic testing - [employer] 84 gene panel- negative\par \par Follow up in weekly for labs and in 3 weeks for C3 treatment\par CBC, CMP, magnesium, phosphorus

## 2021-02-02 NOTE — HISTORY OF PRESENT ILLNESS
[de-identified] : Mr. Juwan Cheung is 60 year old male  here for initial consultation for recent diagnosed of  poorly differentiated carcinoma of tongue\par \par He has PMHx of thoracic aortic aneurysm,  GERD, non-smoker who initially presented with persistent constant phlegm for years that patient always had to clear his throat here and there but  phlegm started to get stuck in his throat over the last couple of months. Patient followed up with doctors who diagnosed with reflux. \par He saw  Dr. Germán Novak on 9/11/20 who started patient on Nexium  after fiberoptic exam. Symptoms did not improved and new fiberoptic exam 10/9/20 showed lingual tonsillar midline still very enlarged but less edema and erythema with large cyst measuring 3 cm in greatest dimension. \par \par 10/15/20 CT of neck - mass measuring 2.5 x 1.7 x2.4 cm the midline vallecular that is about the same density as his lingual tonsil tissue.  Multiple enlarged right level 2 Cervical LNs.\par 10/22/20 -  Patient was then had CT of neck and  2nd Microlaryngoscopy with biopsy of epiglottis/vallecular mass\par -POORLY DIFERRENTIATED CARCINOMA , stains strongly positive for pancytokeratin, p63, CK5/6, and P16. weakly positive for cytokeratin 7.\par \par Mother with breast cancer in her 70s, Maternal grandfather with lung cancer (heavy smoker)\par Nonsmoker,social drinker\par \par Patient reports of doing well, still has phlegm.\par No fevers chills night sweats\par No fatigue, tiredness, apathy\par No appetite loss or weight loss/weight gain\par No chest pain, shortness of breath, palpitation, dizziness\par No abdominal pain, nausea, vomiting, diarrhea, constipation\par No bright red blood per rectum, hematemesis or melena\par No hematuria, dysuria, frequency, urgency\par No headaches, visual changes, focal weakness, tingling, numbness\par \par Has seen Rad Onc.\par Has scheduled second opinion with Dr Jonathan Goldberg ([facility]), Dr Eben Chi ([facility])\par \par PET/CT (10/31/20)\par Base of tongue 2.6 cm SUV 16\par Right level 2 lymph node 1.8 cm x 1.2 cm SUV 13\par Right level 2b LN 1.5 cm, SUV 19\par No distant metastases\par \par He had right base of tongue tumor resection with Dr Nunez at [facility] (12/9/20)\par Surgical pathology- 3 cm unifocal, HPV associated invasive squamous cell carcinoma, with lymphoepithelial feature. \par Margin was positive and 1 out the 5 positive lymph nodes had extranodal extension\par No lymphovascular or perineural invasion was seen. \par IHC: p16 positive. \par Pathological staging: pT2 N2.  [de-identified] : He is seen today for follow up and C2 Cisplatin (1/12/21 - present) \par Radiation - (1/11/2021 -present)\par \par He co hiccups after C1 - lasted for 6 hours -> thorazine interacts with lexapro\par Has nausea, but no vomiting\par He had tinnitus both ears, lasted for 2 days\par He has dysphagia has slowly getting worse, now using magic mouthwash \par He is trying to maintain his weight - takes smoothies, ensure\par \par Anxiety/ depression - Currently only taking Lexapro 10mg and Xanax 0.5mg bid. \par \par Weight - 193 lbs -> 195 lbs -> 191 lbs. \par \par \par

## 2021-02-03 NOTE — PHYSICAL EXAM
[Normal] : oriented to person, place and time, the affect was normal, the mood was normal and not anxious [de-identified] : No erythema. Dry mucous membranes, grade 1 mucositis [de-identified] : mild erythema

## 2021-02-03 NOTE — HISTORY OF PRESENT ILLNESS
[FreeTextEntry1] : Mr. Cheung is present for OTV. He reports everything remains stable. He has persistent throat discomfort and continues to use MMW. He was given Tramadol at last office but has not used it as yet. He reports xerostomia and uses OTC Biotene daily. He reports eating his tiring and his appetite has decreased. He has lost ~4 pounds since last week. He states he has started to introduce high calorie ensure. He is scheduled for an swallow consultation on Monday, February 9th.

## 2021-02-03 NOTE — DISEASE MANAGEMENT
[Pathological] : TNM Stage: p [II] : II [TTNM] : 2 [NTNM] : 2 [MTNM] : 0 [de-identified] : 2800 cGy [de-identified] : 6600 cGy [de-identified] : oropharynx/Neck

## 2021-02-03 NOTE — REVIEW OF SYSTEMS
[Dysphagia: Grade 0] : Dysphagia: Grade 0 [Fatigue: Grade 0] : Fatigue: Grade 0 [FreeTextEntry7] : mild throat discomfort

## 2021-02-04 ENCOUNTER — NON-APPOINTMENT (OUTPATIENT)
Age: 61
End: 2021-02-04

## 2021-02-05 ENCOUNTER — RESULT REVIEW (OUTPATIENT)
Age: 61
End: 2021-02-05

## 2021-02-05 ENCOUNTER — APPOINTMENT (OUTPATIENT)
Dept: HEMATOLOGY ONCOLOGY | Facility: CLINIC | Age: 61
End: 2021-02-05
Payer: COMMERCIAL

## 2021-02-05 PROCEDURE — 99072 ADDL SUPL MATRL&STAF TM PHE: CPT

## 2021-02-05 PROCEDURE — 99499A: CUSTOM | Mod: NC

## 2021-02-08 ENCOUNTER — NON-APPOINTMENT (OUTPATIENT)
Age: 61
End: 2021-02-08

## 2021-02-08 ENCOUNTER — RESULT REVIEW (OUTPATIENT)
Age: 61
End: 2021-02-08

## 2021-02-09 ENCOUNTER — RESULT REVIEW (OUTPATIENT)
Age: 61
End: 2021-02-09

## 2021-02-09 ENCOUNTER — NON-APPOINTMENT (OUTPATIENT)
Age: 61
End: 2021-02-09

## 2021-02-09 ENCOUNTER — APPOINTMENT (OUTPATIENT)
Dept: HEMATOLOGY ONCOLOGY | Facility: CLINIC | Age: 61
End: 2021-02-09
Payer: COMMERCIAL

## 2021-02-09 VITALS
SYSTOLIC BLOOD PRESSURE: 109 MMHG | TEMPERATURE: 96.9 F | HEART RATE: 87 BPM | DIASTOLIC BLOOD PRESSURE: 74 MMHG | HEIGHT: 70 IN | OXYGEN SATURATION: 98 % | BODY MASS INDEX: 27.2 KG/M2 | WEIGHT: 190 LBS | RESPIRATION RATE: 18 BRPM

## 2021-02-09 VITALS
DIASTOLIC BLOOD PRESSURE: 79 MMHG | OXYGEN SATURATION: 97 % | RESPIRATION RATE: 18 BRPM | HEART RATE: 80 BPM | BODY MASS INDEX: 27.62 KG/M2 | WEIGHT: 192.5 LBS | SYSTOLIC BLOOD PRESSURE: 116 MMHG

## 2021-02-09 PROCEDURE — 99072 ADDL SUPL MATRL&STAF TM PHE: CPT

## 2021-02-09 PROCEDURE — 99214 OFFICE O/P EST MOD 30 MIN: CPT

## 2021-02-09 RX ORDER — TBO-FILGRASTIM 480 UG/.8ML
480 INJECTION, SOLUTION SUBCUTANEOUS
Qty: 5 | Refills: 2 | Status: DISCONTINUED | COMMUNITY
Start: 2021-02-02 | End: 2021-02-09

## 2021-02-09 RX ORDER — TBO-FILGRASTIM 480 UG/.8ML
480 INJECTION, SOLUTION SUBCUTANEOUS
Qty: 5 | Refills: 2 | Status: DISCONTINUED | COMMUNITY
Start: 2021-01-26 | End: 2021-02-09

## 2021-02-09 NOTE — RESULTS/DATA
[FreeTextEntry1] : Labs, imaging, path reviewed, analyzed and discussed\par 2/9/21 \par wbc 3.6 hgb 13.1 hct 35.6 plts 206 ANC 2.7\par BUN/Cr - 34/1.4

## 2021-02-09 NOTE — HISTORY OF PRESENT ILLNESS
[de-identified] : Mr. Juwan Cheung is 60 year old male  here for initial consultation for recent diagnosed of  poorly differentiated carcinoma of tongue\par \par He has PMHx of thoracic aortic aneurysm,  GERD, non-smoker who initially presented with persistent constant phlegm for years that patient always had to clear his throat here and there but  phlegm started to get stuck in his throat over the last couple of months. Patient followed up with doctors who diagnosed with reflux. \par He saw  Dr. Germán Novak on 9/11/20 who started patient on Nexium  after fiberoptic exam. Symptoms did not improved and new fiberoptic exam 10/9/20 showed lingual tonsillar midline still very enlarged but less edema and erythema with large cyst measuring 3 cm in greatest dimension. \par \par 10/15/20 CT of neck - mass measuring 2.5 x 1.7 x2.4 cm the midline vallecular that is about the same density as his lingual tonsil tissue.  Multiple enlarged right level 2 Cervical LNs.\par 10/22/20 -  Patient was then had CT of neck and  2nd Microlaryngoscopy with biopsy of epiglottis/vallecular mass\par -POORLY DIFERRENTIATED CARCINOMA , stains strongly positive for pancytokeratin, p63, CK5/6, and P16. weakly positive for cytokeratin 7.\par \par Mother with breast cancer in her 70s, Maternal grandfather with lung cancer (heavy smoker)\par Nonsmoker,social drinker\par \par Patient reports of doing well, still has phlegm.\par No fevers chills night sweats\par No fatigue, tiredness, apathy\par No appetite loss or weight loss/weight gain\par No chest pain, shortness of breath, palpitation, dizziness\par No abdominal pain, nausea, vomiting, diarrhea, constipation\par No bright red blood per rectum, hematemesis or melena\par No hematuria, dysuria, frequency, urgency\par No headaches, visual changes, focal weakness, tingling, numbness\par \par Has seen Rad Onc.\par Has scheduled second opinion with Dr Jonathan Goldberg ([facility]), Dr Eben Chi ([facility])\par \par PET/CT (10/31/20)\par Base of tongue 2.6 cm SUV 16\par Right level 2 lymph node 1.8 cm x 1.2 cm SUV 13\par Right level 2b LN 1.5 cm, SUV 19\par No distant metastases\par \par He had right base of tongue tumor resection with Dr Nunez at [facility] (12/9/20)\par Surgical pathology- 3 cm unifocal, HPV associated invasive squamous cell carcinoma, with lymphoepithelial feature. \par Margin was positive and 1 out the 5 positive lymph nodes had extranodal extension\par No lymphovascular or perineural invasion was seen. \par IHC: p16 positive. \par Pathological staging: pT2 N2.  [de-identified] : He is seen today for follow up, s/p  C2 Cisplatin (1/12/21 - present) \par Radiation - (1/11/2021 -present)\par \par Patient had more fatigue with some dizziness after 2nd cycle, passed out for a few second in the bathroom on 2/6/21 due to dizziness, quickly recovered and denies of any injuries. He's feeling better today. His dysphagia is the same, currently on Magic mouth wash and hardly using any Tramadol.  He's pushing for fluid and calories intake, IVH after chemo has helped. \par post chemo hiccups resolved with Compazine. \par Tinnitus of ears - no hearing loss - comes and goes, not as bad as first treatment\par \par Anxiety/ depression - Currently only taking Lexapro 10 mg and Xanax 0.5 mg bid. \par \par Weight - 193 lbs -> 195 lbs -> 191 lbs -> 190 lbs \par \par \par

## 2021-02-09 NOTE — ASSESSMENT
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 Poorly differentiated\par s/p right base of tongue tumor resection with Dr Nunez at [facility] (12/9/20)\par Pathological staging: pT2 N2. \par Positive margin and 1 out the 5 positive lymph nodes had extranodal extension\par \par s/p C2 cisplatin (1/12/21 - present) - here for follow up\par Radiation (1/11/21 - 2/23/21) - total 6000 cGy\par Labs reviewed, analyzed and discussed\par Agranulocytosis from chemotherapy and radiation - resolved, has not needed r GCSF x 3\par - Hiccups- Patient cannot take Thorazine since it'll prolong QT interval when taking with Lexapro. Resolved with  Compazine 10 mg TID x 5 days post chemo,\par Nausea- Zofran PRN. Continue with Decadron post chemo \par Tinnitus- likely from cisplatin. Comes and goes - not as bad as prior treatment. \par \par Plan:\par Cisplatin 50 mg/m2 days 1,2; 21,22; 42,43\par Follow up with audiometry after every cisplatin dose\par \par Dysphagia \par Combination of radiation and mucositis from neutropenia\par Ccontinue with Magic mouthwash and Tramadol PRN\par IVH post chemo \par \par Elevated BUN/Cr - 34/1.4 - IVH with chemo - advised to increase PO fluid intake.\par will continue to monitor.\par Dizziness and 1 episode of passing out last week - 2/2 dehydration/hypotension - advised to check his BP at home.\par \par Anxiety\par Follows with Dr Vira Bentley (psych)\par -on Lexapro 10mg and Xanax 0.5mg bid\par -Advised to see therapists as well and patient states he'll look into it. \par \par #constipation - improved with Miralaax\par \par #Personal history of cancer\par Nonsmoker,social drinker\par Mother with breast cancer in her 70s, Maternal grandfather with lung cancer (heavy smoker)\par Genetic testing - [employer] 84 gene panel- negative\par \par Follow up in weekly for labs and in 2 weeks for C3 treatment\par CBC, CMP, magnesium, phosphorus

## 2021-02-09 NOTE — DISEASE MANAGEMENT
[Pathological] : TNM Stage: p [II] : II [TTNM] : 2 [MTNM] : 0 [NTNM] : 2 [de-identified] : 3800 cGy [de-identified] : 6600 cGy [de-identified] : oropharynx/Neck

## 2021-02-09 NOTE — HISTORY OF PRESENT ILLNESS
[FreeTextEntry1] : Mr. Cheung is present for OTV. He is status post fraction 19 / 33 of radiation therapy. He reports decreased appetite and intake. He was consulted and evaluated by speech and swallow on 2/8/21. He reports slow thin liquid intake and/or thickened liquids were recommended along with at home exercises. His weight is stable at 192.8 lbs today. He continues to use MMW and Tramdol as needed. Patient notes his anxiety has decreased while taking lexapro.

## 2021-02-09 NOTE — PHYSICAL EXAM
[Normal] : no palpable adenopathy [de-identified] : No erythema. Dry mucous membranes, patchy grade 2 mucositis [de-identified] : mild erythema

## 2021-02-11 ENCOUNTER — NON-APPOINTMENT (OUTPATIENT)
Age: 61
End: 2021-02-11

## 2021-02-16 ENCOUNTER — RESULT REVIEW (OUTPATIENT)
Age: 61
End: 2021-02-16

## 2021-02-16 ENCOUNTER — NON-APPOINTMENT (OUTPATIENT)
Age: 61
End: 2021-02-16

## 2021-02-16 ENCOUNTER — APPOINTMENT (OUTPATIENT)
Dept: HEMATOLOGY ONCOLOGY | Facility: CLINIC | Age: 61
End: 2021-02-16
Payer: COMMERCIAL

## 2021-02-16 VITALS
HEART RATE: 85 BPM | HEIGHT: 70 IN | DIASTOLIC BLOOD PRESSURE: 78 MMHG | WEIGHT: 189.75 LBS | SYSTOLIC BLOOD PRESSURE: 113 MMHG | BODY MASS INDEX: 27.17 KG/M2 | OXYGEN SATURATION: 98 % | RESPIRATION RATE: 18 BRPM

## 2021-02-16 PROCEDURE — 99072 ADDL SUPL MATRL&STAF TM PHE: CPT

## 2021-02-16 PROCEDURE — 99499A: CUSTOM | Mod: NC

## 2021-02-18 NOTE — HISTORY OF PRESENT ILLNESS
[FreeTextEntry1] : Mr. Cheung is present for OTV today.  He reports his intake mostly contains high protein shakes and one solid meal per day. He reports grade 2 dysgeusia.  His weight is 189.12 lbs today, 192 lbs last week. He reports he is scheduled for IV hydration x 3 days s/p chemotherapy next week (last cycle). He has dry mouth and continues Biotene and salt water/baking soda rinses daily. He reports some oral discomfort with fluoride tray use. Overall, he tolerating radiation therapy well and looking forward to completion.

## 2021-02-18 NOTE — REVIEW OF SYSTEMS
[Dysphagia: Grade 0] : Dysphagia: Grade 0 [Fatigue: Grade 0] : Fatigue: Grade 0 [Dysgeusia: Grade 2 - Altered taste with change in diet (e.g., oral supplements); noxious or unpleasant taste; loss of taste] : Dysgeusia: Grade 2 - Altered taste with change in diet (e.g., oral supplements); noxious or unpleasant taste; loss of taste [FreeTextEntry7] : mild throat discomfort

## 2021-02-18 NOTE — DISEASE MANAGEMENT
[Pathological] : TNM Stage: p [II] : II [TTNM] : 2 [NTNM] : 2 [MTNM] : 0 [de-identified] : 4600 cGy [de-identified] : 6600 cGy [de-identified] : oropharynx/Neck

## 2021-02-18 NOTE — PHYSICAL EXAM
[Normal] : oriented to person, place and time, the affect was normal, the mood was normal and not anxious [de-identified] : No erythema. Dry mucous membranes, patchy grade 3 mucositis [de-identified] : mild erythema and hyperpigmentation , no desquamation

## 2021-02-19 ENCOUNTER — NON-APPOINTMENT (OUTPATIENT)
Age: 61
End: 2021-02-19

## 2021-02-23 ENCOUNTER — RESULT REVIEW (OUTPATIENT)
Age: 61
End: 2021-02-23

## 2021-02-23 ENCOUNTER — NON-APPOINTMENT (OUTPATIENT)
Age: 61
End: 2021-02-23

## 2021-02-23 ENCOUNTER — APPOINTMENT (OUTPATIENT)
Dept: HEMATOLOGY ONCOLOGY | Facility: CLINIC | Age: 61
End: 2021-02-23
Payer: COMMERCIAL

## 2021-02-23 VITALS
SYSTOLIC BLOOD PRESSURE: 101 MMHG | OXYGEN SATURATION: 97 % | BODY MASS INDEX: 26.64 KG/M2 | DIASTOLIC BLOOD PRESSURE: 69 MMHG | WEIGHT: 186.1 LBS | HEIGHT: 70 IN | TEMPERATURE: 97.6 F | HEART RATE: 108 BPM | RESPIRATION RATE: 18 BRPM

## 2021-02-23 VITALS
HEART RATE: 93 BPM | DIASTOLIC BLOOD PRESSURE: 80 MMHG | HEIGHT: 70 IN | BODY MASS INDEX: 27.06 KG/M2 | SYSTOLIC BLOOD PRESSURE: 146 MMHG | OXYGEN SATURATION: 97 % | RESPIRATION RATE: 18 BRPM | WEIGHT: 189 LBS

## 2021-02-23 DIAGNOSIS — L98.9 DISORDER OF THE SKIN AND SUBCUTANEOUS TISSUE, UNSPECIFIED: ICD-10-CM

## 2021-02-23 DIAGNOSIS — Z51.11 ENCOUNTER FOR ANTINEOPLASTIC CHEMOTHERAPY: ICD-10-CM

## 2021-02-23 DIAGNOSIS — K12.30 ORAL MUCOSITIS (ULCERATIVE), UNSPECIFIED: ICD-10-CM

## 2021-02-23 DIAGNOSIS — R06.6 HICCOUGH: ICD-10-CM

## 2021-02-23 DIAGNOSIS — Z79.899 OTHER LONG TERM (CURRENT) DRUG THERAPY: ICD-10-CM

## 2021-02-23 PROCEDURE — 99072 ADDL SUPL MATRL&STAF TM PHE: CPT

## 2021-02-23 PROCEDURE — 99215 OFFICE O/P EST HI 40 MIN: CPT

## 2021-02-23 NOTE — HISTORY OF PRESENT ILLNESS
[de-identified] : Mr. Juwan Cheung is 60 year old male  here for initial consultation for recent diagnosed of  poorly differentiated carcinoma of tongue\par \par He has PMHx of thoracic aortic aneurysm,  GERD, non-smoker who initially presented with persistent constant phlegm for years that patient always had to clear his throat here and there but  phlegm started to get stuck in his throat over the last couple of months. Patient followed up with doctors who diagnosed with reflux. \par He saw  Dr. Germán Novak on 9/11/20 who started patient on Nexium  after fiberoptic exam. Symptoms did not improved and new fiberoptic exam 10/9/20 showed lingual tonsillar midline still very enlarged but less edema and erythema with large cyst measuring 3 cm in greatest dimension. \par \par 10/15/20 CT of neck - mass measuring 2.5 x 1.7 x2.4 cm the midline vallecular that is about the same density as his lingual tonsil tissue.  Multiple enlarged right level 2 Cervical LNs.\par 10/22/20 -  Patient was then had CT of neck and  2nd Microlaryngoscopy with biopsy of epiglottis/vallecular mass\par -POORLY DIFERRENTIATED CARCINOMA , stains strongly positive for pancytokeratin, p63, CK5/6, and P16. weakly positive for cytokeratin 7.\par \par Mother with breast cancer in her 70s, Maternal grandfather with lung cancer (heavy smoker)\par Nonsmoker,social drinker\par \par Patient reports of doing well, still has phlegm.\par No fevers chills night sweats\par No fatigue, tiredness, apathy\par No appetite loss or weight loss/weight gain\par No chest pain, shortness of breath, palpitation, dizziness\par No abdominal pain, nausea, vomiting, diarrhea, constipation\par No bright red blood per rectum, hematemesis or melena\par No hematuria, dysuria, frequency, urgency\par No headaches, visual changes, focal weakness, tingling, numbness\par \par Has seen Rad Onc.\par Has scheduled second opinion with Dr Jonathan Goldberg ([facility]), Dr Eben Chi ([facility])\par \par PET/CT (10/31/20)\par Base of tongue 2.6 cm SUV 16\par Right level 2 lymph node 1.8 cm x 1.2 cm SUV 13\par Right level 2b LN 1.5 cm, SUV 19\par No distant metastases\par \par He had right base of tongue tumor resection with Dr Nunez at [facility] (12/9/20)\par Surgical pathology- 3 cm unifocal, HPV associated invasive squamous cell carcinoma, with lymphoepithelial feature. \par Margin was positive and 1 out the 5 positive lymph nodes had extranodal extension\par No lymphovascular or perineural invasion was seen. \par IHC: p16 positive. \par Pathological staging: pT2 N2.  [de-identified] : He is seen today for follow up and C3 Cisplatin (1/12/21 - present) \par Radiation - (1/11/2021 -present)\par \par He felt very tired after the C2- "was knocked out" \par Could not move. He felt dizzy, lightheaded\par Had presyncope but no fall\par Had tinnitus - lasted longer with C2 compared to C1. No trouble hearing\par Has sore in his mouth\par He reports bumps on his head\par \par Has lost 5 lbs since last visit\par Hiccups better with compazine\par \par Anxiety/ depression - Currently only taking Lexapro 10mg and Xanax 0.5mg bid. \par \par Weight - 193 lbs -> 195 lbs -> 191 lbs. -> 186 lbs\par \par \par

## 2021-02-23 NOTE — PHYSICAL EXAM
[Fully active, able to carry on all pre-disease performance without restriction] : Status 0 - Fully active, able to carry on all pre-disease performance without restriction [Normal] : affect appropriate [de-identified] : Bumps on the posterior scalp -? cyst

## 2021-02-24 ENCOUNTER — NON-APPOINTMENT (OUTPATIENT)
Age: 61
End: 2021-02-24

## 2021-02-24 NOTE — DISEASE MANAGEMENT
[Pathological] : TNM Stage: p [II] : II [TTNM] : 2 [NTNM] : 2 [MTNM] : 0 [de-identified] : 5600 cGy [de-identified] : 6600 cGy [de-identified] : oropharynx/Neck

## 2021-02-24 NOTE — HISTORY OF PRESENT ILLNESS
[FreeTextEntry1] : Mr. Cheung is s/p fraction 27 / 33 of radiation to the oropharynx. He reports sore in mouth that was bothersome for the last few days, but improved today (continue MMW and salt water/baking soda rinses). He reports oral intake has been stable, continues to drink high protein shakes, not really taking solid food by mouth because of no taste. Mild throat discomfort with eating and is taking Tramadol occasionally.  He offers questions regarding sun spot on chest and 3 bumps on posterior / lateral scalp, possibly cyst. He reports today will be his last cycle of chemotherapy.

## 2021-02-24 NOTE — PHYSICAL EXAM
[Normal] : oriented to person, place and time, the affect was normal, the mood was normal and not anxious [de-identified] : No erythema. Dry mucous membranes, patchy grade 2 mucositis [de-identified] : hyperpigmentation, dry desquamation bilaterally [de-identified] : 3 cystic like bumps to the posterior scalp

## 2021-03-01 ENCOUNTER — NON-APPOINTMENT (OUTPATIENT)
Age: 61
End: 2021-03-01

## 2021-03-01 VITALS — WEIGHT: 181.25 LBS | HEIGHT: 70 IN | BODY MASS INDEX: 25.95 KG/M2

## 2021-03-01 DIAGNOSIS — R06.02 SHORTNESS OF BREATH: ICD-10-CM

## 2021-03-02 ENCOUNTER — APPOINTMENT (OUTPATIENT)
Dept: HEMATOLOGY ONCOLOGY | Facility: CLINIC | Age: 61
End: 2021-03-02
Payer: COMMERCIAL

## 2021-03-02 ENCOUNTER — RESULT REVIEW (OUTPATIENT)
Age: 61
End: 2021-03-02

## 2021-03-02 ENCOUNTER — NON-APPOINTMENT (OUTPATIENT)
Age: 61
End: 2021-03-02

## 2021-03-02 VITALS
BODY MASS INDEX: 25.91 KG/M2 | TEMPERATURE: 97.8 F | RESPIRATION RATE: 18 BRPM | DIASTOLIC BLOOD PRESSURE: 65 MMHG | OXYGEN SATURATION: 97 % | WEIGHT: 181 LBS | HEIGHT: 70 IN | HEART RATE: 98 BPM | SYSTOLIC BLOOD PRESSURE: 103 MMHG

## 2021-03-02 DIAGNOSIS — R13.10 DYSPHAGIA, UNSPECIFIED: ICD-10-CM

## 2021-03-02 DIAGNOSIS — H93.19 TINNITUS, UNSPECIFIED EAR: ICD-10-CM

## 2021-03-02 DIAGNOSIS — Z51.11 ENCOUNTER FOR ANTINEOPLASTIC CHEMOTHERAPY: ICD-10-CM

## 2021-03-02 DIAGNOSIS — F41.9 ANXIETY DISORDER, UNSPECIFIED: ICD-10-CM

## 2021-03-02 PROCEDURE — 99072 ADDL SUPL MATRL&STAF TM PHE: CPT

## 2021-03-02 PROCEDURE — 99214 OFFICE O/P EST MOD 30 MIN: CPT

## 2021-03-02 NOTE — ASSESSMENT
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 Poorly differentiated\par s/p right base of tongue tumor resection with Dr Nunez at [facility] (12/9/20)\par Pathological staging: pT2 N2. \par Positive margin and 1 out the 5 positive lymph nodes had extranodal extension\par \par Here for follow up\par Getting his last day of radiation today\par s/p C3 Cisplatin (1/12/21 - 2/23/21) \par Radiation - (1/11/2021 - 3/2/21) - total 6000 cGy\par Labs reviewed, analyzed and discussed\par Agranulocytosis from chemotherapy and radiation-\par WBC better with GCSF x 1 last week\par Hiccups- Better with compazine 10 mg TID x 5 days post chemo,\par Nausea- Zofran PRN. Continue with decadron post chemo \par Tinnitus- likely from cisplatin. Lasted longer with C2. Explained that there is no medication to treat tinnitus per say. Can use xanax PRN. He understands. Wants to continue cisplatin as his goal is cure\par \par Bumps on the posterior scalp\par He reports the one around the midline has been there for "decades", he has started to feel few on left side. \par He has lost weight/ subcut\par Monitor for now\par \par Dysphagia \par Combination of radiation and mucositis from neutropenia\par Ccontinue with Magic mouthwash and Tramadol PRN\par Hydration 1000 cc NS PRN\par He declines hydration today\par \par Anxiety\par Follows with Dr Vira Bentley (psych)\par -not improved with Lexapro 10mg and Xanax 0.5mg bid - advised to go up to Lexapro 20mg and follows up with Dr. Bentley. \par -Advised to see therapists as well and patient states he'll look into it. \par \par #constipation - improved with Miralaax\par \par #Personal history of cancer\par Nonsmoker,social drinker\par Mother with breast cancer in her 70s, Maternal grandfather with lung cancer (heavy smoker)\par Genetic testing - [employer] 84 gene panel- negative\par \par Follow up in 1 month\par CBC, CMP, magnesium, phosphorus

## 2021-03-02 NOTE — PHYSICAL EXAM
[Fully active, able to carry on all pre-disease performance without restriction] : Status 0 - Fully active, able to carry on all pre-disease performance without restriction [Normal] : affect appropriate [de-identified] : Bumps on the posterior scalp -? cyst

## 2021-03-02 NOTE — HISTORY OF PRESENT ILLNESS
Progress Notes by Brandy Martinez at 05/30/18 03:30 PM     Author:  Brandy Martinez Service:  (none) Author Type:  Patient      Filed:  05/30/18 03:31 PM Encounter Date:  5/30/2018 Status:  Signed     :  Brandy Martinez (Patient )            Information from CMS [employer] Access Portal:    Patient Medicare Certificate Number[MG1.1T] [id number][MG1.1C]     Plan:[MG1.1T] B[MG1.1M]    Effective Date:[MG1.1T]  2.1.2007[MG1.1M]    Patient[MG1.1T] has[MG1.1M] met the deductible for[MG1.1T] 2018[MG1.1M] calendar year.    Patient has used $[MG1.1T]0[MG1.1M] towards the[MG1.1T]  Physical Therapy and Occupational Therapy[MG1.1M] cap in[MG1.1T] 2018[MG1.1M] calendar year    Home Health:[MG1.1T] No[MG1.1M]      Revision History        User Key Date/Time User Provider Type Action    > MG1.1 05/30/18 03:31 PM Brandy Martinez Patient  Sign    C - Copied, M - Manual, T - Template             [de-identified] : Mr. Juwan Cheung is 60 year old male  here for initial consultation for recent diagnosed of  poorly differentiated carcinoma of tongue\par \par He has PMHx of thoracic aortic aneurysm,  GERD, non-smoker who initially presented with persistent constant phlegm for years that patient always had to clear his throat here and there but  phlegm started to get stuck in his throat over the last couple of months. Patient followed up with doctors who diagnosed with reflux. \par He saw  Dr. Germán Novak on 9/11/20 who started patient on Nexium  after fiberoptic exam. Symptoms did not improved and new fiberoptic exam 10/9/20 showed lingual tonsillar midline still very enlarged but less edema and erythema with large cyst measuring 3 cm in greatest dimension. \par \par 10/15/20 CT of neck - mass measuring 2.5 x 1.7 x2.4 cm the midline vallecular that is about the same density as his lingual tonsil tissue.  Multiple enlarged right level 2 Cervical LNs.\par 10/22/20 -  Patient was then had CT of neck and  2nd Microlaryngoscopy with biopsy of epiglottis/vallecular mass\par -POORLY DIFERRENTIATED CARCINOMA , stains strongly positive for pancytokeratin, p63, CK5/6, and P16. weakly positive for cytokeratin 7.\par \par Mother with breast cancer in her 70s, Maternal grandfather with lung cancer (heavy smoker)\par Nonsmoker,social drinker\par \par Patient reports of doing well, still has phlegm.\par No fevers chills night sweats\par No fatigue, tiredness, apathy\par No appetite loss or weight loss/weight gain\par No chest pain, shortness of breath, palpitation, dizziness\par No abdominal pain, nausea, vomiting, diarrhea, constipation\par No bright red blood per rectum, hematemesis or melena\par No hematuria, dysuria, frequency, urgency\par No headaches, visual changes, focal weakness, tingling, numbness\par \par Has seen Rad Onc.\par Has scheduled second opinion with Dr Jonathan Goldberg ([facility]), Dr Eben Chi ([facility])\par \par PET/CT (10/31/20)\par Base of tongue 2.6 cm SUV 16\par Right level 2 lymph node 1.8 cm x 1.2 cm SUV 13\par Right level 2b LN 1.5 cm, SUV 19\par No distant metastases\par \par He had right base of tongue tumor resection with Dr Nunez at [facility] (12/9/20)\par Surgical pathology- 3 cm unifocal, HPV associated invasive squamous cell carcinoma, with lymphoepithelial feature. \par Margin was positive and 1 out the 5 positive lymph nodes had extranodal extension\par No lymphovascular or perineural invasion was seen. \par IHC: p16 positive. \par Pathological staging: pT2 N2.  [de-identified] : He is seen today for follow up \par s/p C3 Cisplatin (1/12/21 - 2/23/21) \par Radiation - (1/11/2021 - 3/2/21)\par \par He continues to be tired, dizzy, lightheaded- Declines hydration today\par He feels shortness of breath- his CXR today is normal\par \par He Had tinnitus with C3 but not as severe as last time\par No trouble hearing\par \par Has lost 5 lbs since last visit\par Hiccups better with compazine\par \par Anxiety/ depression - Currently only taking Lexapro 10mg and Xanax 0.5mg bid. \par \par Weight - 193 lbs -> 195 lbs -> 191 lbs. -> 186 lbs -> 181 lbs\par \par \par

## 2021-03-03 ENCOUNTER — NON-APPOINTMENT (OUTPATIENT)
Age: 61
End: 2021-03-03

## 2021-03-03 NOTE — HISTORY OF PRESENT ILLNESS
[FreeTextEntry1] : Mr. Cheung is s/p fraction 32 / 33 of radiation to the oropharynx. He reports increasing productions of thick, copious phlegm that he is unable to manage and clear. He reports choking and mild shortness of breath.  He reports oral intake now consist of only high protein shakes secondary to dysgeusia. He is taking Tramadol on occasion. CXR ordered for further evaluation of shortness of breath. He will complete radiation therapy tomorrow.

## 2021-03-03 NOTE — PHYSICAL EXAM
[Normal] : oriented to person, place and time, the affect was normal, the mood was normal and not anxious [de-identified] : No erythema. Dry mucous membranes, patchy grade 2-3 mucositis  [de-identified] : hyperpigmentation, dry desquamation bilaterally [de-identified] : 3 cystic like bumps to the posterior scalp

## 2021-03-03 NOTE — DISEASE MANAGEMENT
[Pathological] : TNM Stage: p [II] : II [TTNM] : 2 [NTNM] : 2 [MTNM] : 0 [de-identified] : 6400 cGy [de-identified] : 6600 cGy [de-identified] : oropharynx/Neck

## 2021-03-05 ENCOUNTER — NON-APPOINTMENT (OUTPATIENT)
Age: 61
End: 2021-03-05

## 2021-03-12 ENCOUNTER — NON-APPOINTMENT (OUTPATIENT)
Age: 61
End: 2021-03-12

## 2021-03-16 ENCOUNTER — APPOINTMENT (OUTPATIENT)
Dept: HEMATOLOGY ONCOLOGY | Facility: CLINIC | Age: 61
End: 2021-03-16

## 2021-03-19 ENCOUNTER — NON-APPOINTMENT (OUTPATIENT)
Age: 61
End: 2021-03-19

## 2021-04-05 ENCOUNTER — NON-APPOINTMENT (OUTPATIENT)
Age: 61
End: 2021-04-05

## 2021-04-09 ENCOUNTER — APPOINTMENT (OUTPATIENT)
Dept: HEART AND VASCULAR | Facility: CLINIC | Age: 61
End: 2021-04-09
Payer: COMMERCIAL

## 2021-04-09 VITALS
OXYGEN SATURATION: 97 % | SYSTOLIC BLOOD PRESSURE: 100 MMHG | RESPIRATION RATE: 18 BRPM | DIASTOLIC BLOOD PRESSURE: 60 MMHG | WEIGHT: 178 LBS | TEMPERATURE: 97.7 F | HEART RATE: 71 BPM | BODY MASS INDEX: 25.48 KG/M2 | HEIGHT: 70 IN

## 2021-04-09 PROCEDURE — 99215 OFFICE O/P EST HI 40 MIN: CPT

## 2021-04-09 PROCEDURE — 93306 TTE W/DOPPLER COMPLETE: CPT

## 2021-04-09 PROCEDURE — 99072 ADDL SUPL MATRL&STAF TM PHE: CPT

## 2021-04-09 NOTE — HISTORY OF PRESENT ILLNESS
[FreeTextEntry1] : Juwan Cheung returns for follow up.  \par \par He was diagnosed carcinoma of the epiglottis/vallecula.  He is s/p surgical resection, radiation and chemotherapy.\par \par He denies cp, pnd, orthopnea, edema, palp, or loc.  He notes feeling ESPARZA.\par \par He is trying to increase his activity.  He is compliant with meds.\par \par EXSE 6/2020: nl lv sys fxn; indeterminant blank fxn; LVH; aortic root 4.2 cm; mild to mod MR; mild TR; 12:25 min Yuan; no ischemia by WM\par CPET 6/2020: ischemic myocardial dysfxn; 104% predicted peak VO2; good peak O2 pulse; low AT\par Blood work 3/2021: anemia\par \par Reviewed clinical hx and results in detail.\par \par Recommendations:\par 1. continue CV meds\par 2. TTE with strain\par 3. exercise\par 4. follow up 6 months\par .

## 2021-04-09 NOTE — DISCUSSION/SUMMARY
[Cardiomyopathy] : cardiomyopathy [Coronary Artery Disease] : coronary artery disease [Possible Cardiac Ischemia (Intermd Prob)] : possible cardiac ischemia (intermediate probability) [Dietary Modification] : dietary modification [Exercise Regimen] : an exercise regimen [Hyperlipidemia] : hyperlipidemia [Diet Modification] : diet modification [Exercise] : exercise [Mild Mitral Regurgitation] : mild mitral regurgitation [Left Ventricular Hypertrophy] : left ventricular hypertrophy [Moderate Mitral Regurgitation] : moderate mitral regurgitation [Compensated] : compensated [Sodium Restriction] : sodium restriction [Stable] : stable [None] : none [Exercise Rehab] : exercise rehabilitation [Patient] : the patient [de-identified] : LVH [de-identified] : TAA 4.2 cm [FreeTextEntry1] : TR - mild

## 2021-04-09 NOTE — PHYSICAL EXAM
[General Appearance - Well Developed] : well developed [Normal Appearance] : normal appearance [Well Groomed] : well groomed [General Appearance - Well Nourished] : well nourished [No Deformities] : no deformities [General Appearance - In No Acute Distress] : no acute distress [Normal Conjunctiva] : the conjunctiva exhibited no abnormalities [Eyelids - No Xanthelasma] : the eyelids demonstrated no xanthelasmas [Normal Oral Mucosa] : normal oral mucosa [No Oral Pallor] : no oral pallor [No Oral Cyanosis] : no oral cyanosis [Normal Jugular Venous A Waves Present] : normal jugular venous A waves present [Normal Jugular Venous V Waves Present] : normal jugular venous V waves present [No Jugular Venous Klein A Waves] : no jugular venous klein A waves [Respiration, Rhythm And Depth] : normal respiratory rhythm and effort [Exaggerated Use Of Accessory Muscles For Inspiration] : no accessory muscle use [Auscultation Breath Sounds / Voice Sounds] : lungs were clear to auscultation bilaterally [Heart Rate And Rhythm] : heart rate and rhythm were normal [Heart Sounds] : normal S1 and S2 [FreeTextEntry1] : sys murmur [Abdomen Soft] : soft [Abdomen Tenderness] : non-tender [Abdomen Mass (___ Cm)] : no abdominal mass palpated [Abnormal Walk] : normal gait [Gait - Sufficient For Exercise Testing] : the gait was sufficient for exercise testing [Nail Clubbing] : no clubbing of the fingernails [Cyanosis, Localized] : no localized cyanosis [Petechial Hemorrhages (___cm)] : no petechial hemorrhages [Skin Color & Pigmentation] : normal skin color and pigmentation [] : no rash [No Venous Stasis] : no venous stasis [Skin Lesions] : no skin lesions [No Skin Ulcers] : no skin ulcer [No Xanthoma] : no  xanthoma was observed

## 2021-04-15 ENCOUNTER — APPOINTMENT (OUTPATIENT)
Dept: RADIATION ONCOLOGY | Facility: CLINIC | Age: 61
End: 2021-04-15
Payer: COMMERCIAL

## 2021-04-15 ENCOUNTER — APPOINTMENT (OUTPATIENT)
Dept: HEMATOLOGY ONCOLOGY | Facility: CLINIC | Age: 61
End: 2021-04-15
Payer: COMMERCIAL

## 2021-04-15 ENCOUNTER — RESULT REVIEW (OUTPATIENT)
Age: 61
End: 2021-04-15

## 2021-04-15 VITALS
BODY MASS INDEX: 25.77 KG/M2 | SYSTOLIC BLOOD PRESSURE: 106 MMHG | DIASTOLIC BLOOD PRESSURE: 61 MMHG | OXYGEN SATURATION: 98 % | HEART RATE: 75 BPM | RESPIRATION RATE: 18 BRPM | HEIGHT: 70 IN | WEIGHT: 180 LBS

## 2021-04-15 VITALS
HEART RATE: 76 BPM | BODY MASS INDEX: 25.34 KG/M2 | DIASTOLIC BLOOD PRESSURE: 66 MMHG | RESPIRATION RATE: 16 BRPM | HEIGHT: 70 IN | TEMPERATURE: 96.7 F | SYSTOLIC BLOOD PRESSURE: 101 MMHG | WEIGHT: 177 LBS | OXYGEN SATURATION: 98 %

## 2021-04-15 DIAGNOSIS — B37.0 CANDIDAL STOMATITIS: ICD-10-CM

## 2021-04-15 DIAGNOSIS — F41.9 ANXIETY DISORDER, UNSPECIFIED: ICD-10-CM

## 2021-04-15 PROCEDURE — 99072 ADDL SUPL MATRL&STAF TM PHE: CPT

## 2021-04-15 PROCEDURE — 99214 OFFICE O/P EST MOD 30 MIN: CPT

## 2021-04-15 PROCEDURE — 92511 NASOPHARYNGOSCOPY: CPT

## 2021-04-15 PROCEDURE — 99024 POSTOP FOLLOW-UP VISIT: CPT

## 2021-04-15 RX ORDER — ESCITALOPRAM OXALATE 10 MG/1
10 TABLET ORAL
Refills: 0 | Status: DISCONTINUED | COMMUNITY
End: 2021-04-15

## 2021-04-15 RX ORDER — OXYCODONE 5 MG/1
5 TABLET ORAL
Qty: 60 | Refills: 0 | Status: DISCONTINUED | COMMUNITY
Start: 2020-11-26 | End: 2021-04-15

## 2021-04-15 RX ORDER — DIPHENHYDRAMINE HYDROCHLORIDE AND LIDOCAINE HYDROCHLORIDE AND ALUMINUM HYDROXIDE AND MAGNESIUM HYDRO
KIT
Qty: 1 | Refills: 1 | Status: DISCONTINUED | COMMUNITY
Start: 2021-01-19 | End: 2021-04-15

## 2021-04-15 RX ORDER — DEXAMETHASONE 4 MG/1
4 TABLET ORAL
Qty: 4 | Refills: 1 | Status: DISCONTINUED | COMMUNITY
Start: 2021-01-15 | End: 2021-04-15

## 2021-04-15 RX ORDER — OXYCODONE AND ACETAMINOPHEN 5; 325 MG/1; MG/1
5-325 TABLET ORAL
Qty: 20 | Refills: 0 | Status: DISCONTINUED | COMMUNITY
Start: 2020-10-20 | End: 2021-04-15

## 2021-04-15 RX ORDER — ESCITALOPRAM OXALATE 5 MG/1
5 TABLET ORAL
Qty: 30 | Refills: 0 | Status: DISCONTINUED | COMMUNITY
Start: 2020-12-28 | End: 2021-04-15

## 2021-04-15 RX ORDER — TRAMADOL HYDROCHLORIDE 50 MG/1
50 TABLET, COATED ORAL
Qty: 45 | Refills: 0 | Status: DISCONTINUED | COMMUNITY
Start: 2021-01-26 | End: 2021-04-15

## 2021-04-15 NOTE — PHYSICAL EXAM
[Fully active, able to carry on all pre-disease performance without restriction] : Status 0 - Fully active, able to carry on all pre-disease performance without restriction [Normal] : affect appropriate [de-identified] : Bumps on the posterior scalp -? cyst

## 2021-04-15 NOTE — ASSESSMENT
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 Poorly differentiated\par s/p right base of tongue tumor resection with Dr Nunez at [facility] (12/9/20)\par Pathological staging: pT2 N2. \par Positive margin and 1 out the 5 positive lymph nodes had extranodal extension\par s/p C3 Cisplatin (1/12/21 - 2/23/21) \par Radiation - (1/11/2021 - 3/2/21) - total 6000 cGy\par \par He is gradually recovering from the treatment\par Remains anxious about his upcoming scan\par Labs reviewed, analyzed and discussed\par Agranulocytosis from chemotherapy and radiation-\par Tinnitus- resolved\par Restaging CT to be scheduled by Dr Mccarthy\par \par Bumps on the posterior scalp\par He reports the one around the midline has been there for "decades", he has started to feel few on left side. \par He has lost weight/ subcut\par Monitor for now\par \par Anxiety\par Follows with Dr Vira Bentley (psych)\par -not improved with Lexapro 10mg and Xanax 0.5mg bid - advised to go up to Lexapro 20mg and follows up with Dr. Bentley. \par -Advised to see therapists as well and patient states he'll look into it. \par \par #Personal history of cancer\par Nonsmoker,social drinker\par Mother with breast cancer in her 70s, Maternal grandfather with lung cancer (heavy smoker)\par Genetic testing - [employer]ae 84 gene panel- negative\par \par Follow up in 2 months\par CBC, CMP, magnesium, phosphorus

## 2021-04-15 NOTE — HISTORY OF PRESENT ILLNESS
[de-identified] : Mr. Juwan Cheung is 60 year old male  here for initial consultation for recent diagnosed of  poorly differentiated carcinoma of tongue\par \par He has PMHx of thoracic aortic aneurysm,  GERD, non-smoker who initially presented with persistent constant phlegm for years that patient always had to clear his throat here and there but  phlegm started to get stuck in his throat over the last couple of months. Patient followed up with doctors who diagnosed with reflux. \par He saw  Dr. Germán Novak on 9/11/20 who started patient on Nexium  after fiberoptic exam. Symptoms did not improved and new fiberoptic exam 10/9/20 showed lingual tonsillar midline still very enlarged but less edema and erythema with large cyst measuring 3 cm in greatest dimension. \par \par 10/15/20 CT of neck - mass measuring 2.5 x 1.7 x2.4 cm the midline vallecular that is about the same density as his lingual tonsil tissue.  Multiple enlarged right level 2 Cervical LNs.\par 10/22/20 -  Patient was then had CT of neck and  2nd Microlaryngoscopy with biopsy of epiglottis/vallecular mass\par -POORLY DIFERRENTIATED CARCINOMA , stains strongly positive for pancytokeratin, p63, CK5/6, and P16. weakly positive for cytokeratin 7.\par \par Mother with breast cancer in her 70s, Maternal grandfather with lung cancer (heavy smoker)\par Nonsmoker,social drinker\par \par Patient reports of doing well, still has phlegm.\par No fevers chills night sweats\par No fatigue, tiredness, apathy\par No appetite loss or weight loss/weight gain\par No chest pain, shortness of breath, palpitation, dizziness\par No abdominal pain, nausea, vomiting, diarrhea, constipation\par No bright red blood per rectum, hematemesis or melena\par No hematuria, dysuria, frequency, urgency\par No headaches, visual changes, focal weakness, tingling, numbness\par \par Has seen Rad Onc.\par Has scheduled second opinion with Dr Jonathan Goldberg ([facility]), Dr Eben Chi ([facility])\par \par PET/CT (10/31/20)\par Base of tongue 2.6 cm SUV 16\par Right level 2 lymph node 1.8 cm x 1.2 cm SUV 13\par Right level 2b LN 1.5 cm, SUV 19\par No distant metastases\par \par He had right base of tongue tumor resection with Dr Nunez at [facility] (12/9/20)\par Surgical pathology- 3 cm unifocal, HPV associated invasive squamous cell carcinoma, with lymphoepithelial feature. \par Margin was positive and 1 out the 5 positive lymph nodes had extranodal extension\par No lymphovascular or perineural invasion was seen. \par IHC: p16 positive. \par Pathological staging: pT2 N2.  [de-identified] : He is seen today for follow up \par s/p C3 Cisplatin (1/12/21 - 2/23/21) \par Radiation - (1/11/2021 - 3/2/21)\par \par He feels good overall\par Tired, dizzy, lightheaded- Much better. Now only on severe exertion\par Tinnitus has stopped\par \par Has lost 4 lbs since last visit\par He is eating better. He cannot tolerate certain foods due to dry mouth\par \par Anxiety/ depression - Currently only taking Lexapro 10mg and Xanax 0.5mg bid. \par \par Weight - 193 lbs -> 195 lbs -> 191 lbs. -> 186 lbs -> 181 lbs -> 177 lbs\par \par \par

## 2021-04-15 NOTE — CONSULT LETTER
[Dear  ___] : Dear  [unfilled], [Courtesy Letter:] : I had the pleasure of seeing your patient, [unfilled], in my office today. [Please see my note below.] : Please see my note below. [Consult Closing:] : Thank you very much for allowing me to participate in the care of this patient.  If you have any questions, please do not hesitate to contact me. [Sincerely,] : Sincerely, [FreeTextEntry3] : Jeovany Khan MD, MPH\par Attending Physician\par Hematology Oncology\par [facility]\par [facility]\par

## 2021-04-16 ENCOUNTER — NON-APPOINTMENT (OUTPATIENT)
Age: 61
End: 2021-04-16

## 2021-04-20 NOTE — DISEASE MANAGEMENT
[Pathological] : TNM Stage: p [II] : II [TTNM] : 2 [NTNM] : 2 [MTNM] : 0 [de-identified] : 6600 cGy [de-identified] : 6600 cGy [de-identified] : oropharynx/Neck - completed

## 2021-04-20 NOTE — HISTORY OF PRESENT ILLNESS
[FreeTextEntry1] : Mr. Cheung is a 60 year old male, diagnosed with a clinical stage I , T2N1 p16+ base of tongue poorly differentiated carcinoma status post resection (pT2 N2 - Stage II) s/p adjuvant concurrent chemoradiation and present today for his post treatment evaluation. \par \par Mr. Cheung initially presented with a chief complaint of constant phlegm for many years, worsening in the last couple months. On 9/11/20, he was further evaluated with a fiberoptic/rhinoscopy exam demonstrated mildly deviated septum, lingual tonsils were enlarged at midline and they abutted the epiglottis on the lingual surface. There were marked signs of reflux of the posterior glottic erythema and edema. He was started on Protonix, without any improvement post 1 month. \par \par Re-evaluation with fiberoptic exam on 10/9/20, reported improved signs of reflux but persistent very enlarged lingual tonsillar midline with less edema, with a large 3 cm cyst. Further evaluation with CT NECK W/ IC (10/13/20) demonstrated a 2.5 x1.7 x2.4cm soft tissue mass in the right vallecula and multiple pathologically enlarged right level 2 cervical lymph nodes. \par \par On 10/20/20, he underwent a microlaryngoscopy with biopsy of the epiglottis/vallecular mass by ERLIN Rowland. Pathology revealed \par poorly differentiated carcinoma, stains were strongly positive for pancytokeratin, p63, CK5/6, and P16 and were focally weakly positive for cytokeratin 7. \par \par Staging PET/CT (10/31/20) demonstrated FDG activity at the base of tongue (2.6 cm, SUV 16.3), centered at the right base of tongue and crossed midline. There was FDG activity corresponding with the large right L2 lymph nodes, the largest 1.8 cm, with SUV 13.1. A small. mildly FDG avid right level 2b LN was also present, measuring 1.5 cm with SUV 1.9. No other evidence of metastatic disease was demonstrated. \par \par On 12/9/20 Mr. Cheugn underwent a right base of tongue tumor resection by performed by Dr. Chi. Surgical pathology was reviewed by [facility] and revealed a 3 cm unifocal, HPV associated invasive squamous cell carcinoma, with lymphoepithelial feature. There was tumor present at the true deep cauterized margin. No lymphovascular or perineural invasion was seen. There was right neck involvement at level 2 and 3 with 5 / 28 lymph nodes positive for metastatic carcinoma, . IHC: p16 positive. Pathological staging: pT2 N2. Mr. Cheung tolerated the procedure well. \par \par He received concurrent chemoradiation with Cisplatin x 3 cycle and radiation to the oropharynx/neck to a total dose of 66 Gy, which he completed on 3/2/21. He tolerated radiation well with some acute side effects including dysphagia, xerostomia, dysgeusia, and odynophagia. He reports improvement in all side effects, however xerostomia is still present and restricts him from eating certain foods. His weight is currently at 180 pounds. Denies any fatigue or shortness of breath. His anxiety has improved and he has discontinued the anxiety medications. He walks a 1 mile few times per week. He has follow-up today with Dr. Khan and will schedule an appointment with Dr. Chi for follow-up. He reports he is scheduled for his COVID vaccine on 5/1/21. Overall, he is doing well and improving daily.

## 2021-04-20 NOTE — PROCEDURE
[Surveillance] : monitor for disease recurrence [Topical Lidocaine] : topical lidocaine [Photographs Taken] : photographs taken [FreeTextEntry1] : laryngoscopy  [FreeTextEntry2] : follow-up post completion of adjuvant concurrent chemoRT

## 2021-04-20 NOTE — REVIEW OF SYSTEMS
[Negative] : Heme/Lymph [Dysphagia] : no dysphagia [Odynophagia] : no odynophagia [FreeTextEntry4] : dry mouth

## 2021-04-27 ENCOUNTER — APPOINTMENT (OUTPATIENT)
Dept: HEMATOLOGY ONCOLOGY | Facility: CLINIC | Age: 61
End: 2021-04-27

## 2021-04-30 ENCOUNTER — RESULT REVIEW (OUTPATIENT)
Age: 61
End: 2021-04-30

## 2021-04-30 ENCOUNTER — NON-APPOINTMENT (OUTPATIENT)
Age: 61
End: 2021-04-30

## 2021-05-04 ENCOUNTER — NON-APPOINTMENT (OUTPATIENT)
Age: 61
End: 2021-05-04

## 2021-05-18 ENCOUNTER — APPOINTMENT (OUTPATIENT)
Dept: HEMATOLOGY ONCOLOGY | Facility: CLINIC | Age: 61
End: 2021-05-18

## 2021-05-25 ENCOUNTER — NON-APPOINTMENT (OUTPATIENT)
Age: 61
End: 2021-05-25

## 2021-06-17 ENCOUNTER — RESULT REVIEW (OUTPATIENT)
Age: 61
End: 2021-06-17

## 2021-06-17 ENCOUNTER — APPOINTMENT (OUTPATIENT)
Dept: HEMATOLOGY ONCOLOGY | Facility: CLINIC | Age: 61
End: 2021-06-17
Payer: COMMERCIAL

## 2021-06-17 VITALS
HEART RATE: 56 BPM | OXYGEN SATURATION: 98 % | SYSTOLIC BLOOD PRESSURE: 120 MMHG | WEIGHT: 168 LBS | HEIGHT: 70 IN | BODY MASS INDEX: 24.05 KG/M2 | RESPIRATION RATE: 16 BRPM | TEMPERATURE: 98.1 F | DIASTOLIC BLOOD PRESSURE: 68 MMHG

## 2021-06-17 DIAGNOSIS — R63.4 ABNORMAL WEIGHT LOSS: ICD-10-CM

## 2021-06-17 PROCEDURE — 99214 OFFICE O/P EST MOD 30 MIN: CPT

## 2021-06-17 NOTE — ASSESSMENT
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 Poorly differentiated\par s/p right base of tongue tumor resection with Dr Nunez at [facility] (12/9/20)\par Pathological staging: pT2 N2. \par Positive margin and 1 out the 5 positive lymph nodes had extranodal extension\par s/p C3 Cisplatin (1/12/21 - 2/23/21) \par Radiation - (1/11/2021 - 3/2/21) - total 6000 cGy\par \par He is gradually recovering from the treatment\par CT 4/21 showed treatment changes\par Had laryngoscopy and exam with ENT onc Dr Nunez at [facility]\par Was told exam was normal\par Scheduled for PET/CT in  in a few weeks\par Patient will call to discuss findings/results in a few days\par Labs reviewed, analyzed and discussed\par Agranulocytosis from chemotherapy and radiation- mostly lymphopenia\par Tinnitus- resolved\par \par Bumps on the posterior scalp\par He reports the one around the midline has been there for "decades", he has started to feel few on left side. \par He has lost weight/ subcut\par Monitor for now\par \par Anxiety\par Follows with Dr Vira Bentley (psych)\par -not improved with Lexapro 10mg and Xanax 0.5mg bid - advised to go up to Lexapro 20mg and follows up with Dr. Bentley. \par Advised to dw Dr Bentley about tapering meds if he is feeling better\par \par Personal history of cancer\par Nonsmoker,social drinker\par Mother with breast cancer in her 70s, Maternal grandfather with lung cancer (heavy smoker)\par Genetic testing - [employer] 84 gene panel- negative\par \par Follow up in 3 months\par CBC, CMP, magnesium, phosphorus

## 2021-06-17 NOTE — PHYSICAL EXAM
[Fully active, able to carry on all pre-disease performance without restriction] : Status 0 - Fully active, able to carry on all pre-disease performance without restriction [Normal] : affect appropriate [de-identified] : Bumps on the posterior scalp -? cyst

## 2021-06-17 NOTE — CONSULT LETTER
[Dear  ___] : Dear  [unfilled], [Courtesy Letter:] : I had the pleasure of seeing your patient, [unfilled], in my office today. [Please see my note below.] : Please see my note below. [Consult Closing:] : Thank you very much for allowing me to participate in the care of this patient.  If you have any questions, please do not hesitate to contact me. [Sincerely,] : Sincerely, [FreeTextEntry3] : Jeovany Khan MD, MPH\par Attending Physician\par Hematology Oncology\par [facility]\par [facility]\par

## 2021-06-17 NOTE — HISTORY OF PRESENT ILLNESS
[de-identified] : Mr. Juwan Cheung is 60 year old male  here for initial consultation for recent diagnosed of  poorly differentiated carcinoma of tongue\par \par He has PMHx of thoracic aortic aneurysm,  GERD, non-smoker who initially presented with persistent constant phlegm for years that patient always had to clear his throat here and there but  phlegm started to get stuck in his throat over the last couple of months. Patient followed up with doctors who diagnosed with reflux. \par He saw  Dr. Germán Novak on 9/11/20 who started patient on Nexium  after fiberoptic exam. Symptoms did not improved and new fiberoptic exam 10/9/20 showed lingual tonsillar midline still very enlarged but less edema and erythema with large cyst measuring 3 cm in greatest dimension. \par \par 10/15/20 CT of neck - mass measuring 2.5 x 1.7 x2.4 cm the midline vallecular that is about the same density as his lingual tonsil tissue.  Multiple enlarged right level 2 Cervical LNs.\par 10/22/20 -  Patient was then had CT of neck and  2nd Microlaryngoscopy with biopsy of epiglottis/vallecular mass\par -POORLY DIFERRENTIATED CARCINOMA , stains strongly positive for pancytokeratin, p63, CK5/6, and P16. weakly positive for cytokeratin 7.\par \par Mother with breast cancer in her 70s, Maternal grandfather with lung cancer (heavy smoker)\par Nonsmoker,social drinker\par \par Patient reports of doing well, still has phlegm.\par No fevers chills night sweats\par No fatigue, tiredness, apathy\par No appetite loss or weight loss/weight gain\par No chest pain, shortness of breath, palpitation, dizziness\par No abdominal pain, nausea, vomiting, diarrhea, constipation\par No bright red blood per rectum, hematemesis or melena\par No hematuria, dysuria, frequency, urgency\par No headaches, visual changes, focal weakness, tingling, numbness\par \par Has seen Rad Onc.\par Has scheduled second opinion with Dr Jonathan Goldberg ([facility]), Dr Eben Chi ([facility])\par \par PET/CT (10/31/20)\par Base of tongue 2.6 cm SUV 16\par Right level 2 lymph node 1.8 cm x 1.2 cm SUV 13\par Right level 2b LN 1.5 cm, SUV 19\par No distant metastases\par \par He had right base of tongue tumor resection with Dr Nunez at [facility] (12/9/20)\par Surgical pathology- 3 cm unifocal, HPV associated invasive squamous cell carcinoma, with lymphoepithelial feature. \par Margin was positive and 1 out the 5 positive lymph nodes had extranodal extension\par No lymphovascular or perineural invasion was seen. \par IHC: p16 positive. \par Pathological staging: pT2 N2.  [de-identified] : He is seen today for follow up \par s/p C3 Cisplatin (1/12/21 - 2/23/21) \par Radiation - (1/11/2021 - 3/2/21)\par \par He feels good overall\par Has dry mouth which affects tasting food\par He pushes himself to eat and has maintained himself ~168 lbs. Has lost 9 lbs since last visit\par \par He saw Dr Nunez last week - had laryngoscope - was told DOMINGO\par Scheduled for PET next week\par \par Tinnitus has stopped\par He is eating better. He cannot tolerate certain foods due to dry mouth\par \par Anxiety/ depression - Currently only taking Lexapro 10mg and Xanax 0.5mg bid. \par \par Weight - 193 lbs -> 195 lbs -> 191 lbs. -> 186 lbs -> 181 lbs -> 177 lbs -> 168 lbs\par \par \par

## 2021-06-18 ENCOUNTER — NON-APPOINTMENT (OUTPATIENT)
Age: 61
End: 2021-06-18

## 2021-09-16 ENCOUNTER — APPOINTMENT (OUTPATIENT)
Dept: HEMATOLOGY ONCOLOGY | Facility: CLINIC | Age: 61
End: 2021-09-16
Payer: COMMERCIAL

## 2021-09-16 ENCOUNTER — RESULT REVIEW (OUTPATIENT)
Age: 61
End: 2021-09-16

## 2021-09-16 ENCOUNTER — APPOINTMENT (OUTPATIENT)
Dept: HEART AND VASCULAR | Facility: CLINIC | Age: 61
End: 2021-09-16
Payer: COMMERCIAL

## 2021-09-16 VITALS
HEIGHT: 70 IN | OXYGEN SATURATION: 98 % | DIASTOLIC BLOOD PRESSURE: 72 MMHG | TEMPERATURE: 97.5 F | WEIGHT: 164 LBS | RESPIRATION RATE: 16 BRPM | SYSTOLIC BLOOD PRESSURE: 114 MMHG | HEART RATE: 48 BPM | BODY MASS INDEX: 23.48 KG/M2

## 2021-09-16 VITALS
HEART RATE: 60 BPM | RESPIRATION RATE: 18 BRPM | DIASTOLIC BLOOD PRESSURE: 67 MMHG | SYSTOLIC BLOOD PRESSURE: 109 MMHG | HEIGHT: 70 IN | TEMPERATURE: 97.4 F | OXYGEN SATURATION: 98 % | BODY MASS INDEX: 24.05 KG/M2 | WEIGHT: 168 LBS

## 2021-09-16 DIAGNOSIS — R68.2 DRY MOUTH, UNSPECIFIED: ICD-10-CM

## 2021-09-16 PROCEDURE — 99214 OFFICE O/P EST MOD 30 MIN: CPT

## 2021-09-16 PROCEDURE — 99215 OFFICE O/P EST HI 40 MIN: CPT

## 2021-09-16 RX ORDER — ALPRAZOLAM 0.5 MG/1
0.5 TABLET ORAL
Qty: 30 | Refills: 0 | Status: DISCONTINUED | COMMUNITY
Start: 1900-01-01 | End: 2021-09-16

## 2021-09-16 RX ORDER — FILGRASTIM-AAFI 480 UG/.8ML
480 INJECTION, SOLUTION SUBCUTANEOUS
Qty: 5 | Refills: 5 | Status: DISCONTINUED | COMMUNITY
Start: 2021-02-09 | End: 2021-09-16

## 2021-09-16 RX ORDER — CHLORHEXIDINE GLUCONATE, 0.12% ORAL RINSE 1.2 MG/ML
0.12 SOLUTION DENTAL
Qty: 473 | Refills: 0 | Status: DISCONTINUED | COMMUNITY
Start: 2020-11-26 | End: 2021-09-16

## 2021-09-16 RX ORDER — ONDANSETRON 4 MG/1
4 TABLET, ORALLY DISINTEGRATING ORAL
Qty: 30 | Refills: 3 | Status: DISCONTINUED | COMMUNITY
Start: 2021-01-12 | End: 2021-09-16

## 2021-09-16 RX ORDER — FLUCONAZOLE 100 MG/1
100 TABLET ORAL
Qty: 15 | Refills: 0 | Status: DISCONTINUED | COMMUNITY
Start: 2021-04-15 | End: 2021-09-16

## 2021-09-16 RX ORDER — SODIUM FLUORIDE 6.1 MG/ML
1.1 GEL, DENTIFRICE DENTAL
Qty: 1 | Refills: 0 | Status: DISCONTINUED | COMMUNITY
Start: 2020-11-04 | End: 2021-09-16

## 2021-09-16 RX ORDER — PROCHLORPERAZINE MALEATE 10 MG/1
10 TABLET ORAL
Qty: 30 | Refills: 3 | Status: DISCONTINUED | COMMUNITY
Start: 2021-02-02 | End: 2021-09-16

## 2021-09-16 NOTE — CONSULT LETTER
[Dear  ___] : Dear  [unfilled], [Courtesy Letter:] : I had the pleasure of seeing your patient, [unfilled], in my office today. [Please see my note below.] : Please see my note below. [Consult Closing:] : Thank you very much for allowing me to participate in the care of this patient.  If you have any questions, please do not hesitate to contact me. [Sincerely,] : Sincerely, [FreeTextEntry3] : Jeovany Khan MD, MPH\par Attending Physician\par Hematology Oncology\par [facility] [place]\par [facility]\par

## 2021-09-16 NOTE — PHYSICAL EXAM
[Fully active, able to carry on all pre-disease performance without restriction] : Status 0 - Fully active, able to carry on all pre-disease performance without restriction [Normal] : affect appropriate [de-identified] : Bumps on the posterior scalp -? cyst

## 2021-09-16 NOTE — ASSESSMENT
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 Poorly differentiated\par s/p right base of tongue tumor resection with Dr Nunez at [facility] (12/9/20)\par Pathological staging: pT2 N2. \par Positive margin and 1 out the 5 positive lymph nodes had extranodal extension\par s/p C3 Cisplatin (1/12/21 - 2/23/21) \par Radiation - (1/11/2021 - 3/2/21) - total 6000 cGy\par \par He is recovering well from the treatment\par Had laryngoscopic eval with ENT last week which showed DOMINGO\par He has not yet obtained MRI neck\par Will do PET/CT in [place] in January 2022\par Labs reviewed, analyzed and discussed\par Agranulocytosis from chemotherapy and radiation vs vaccine- mostly lymphopenia\par Tinnitus- resolved\par \par Bumps on the posterior scalp\par He reports the one around the midline has been there for "decades", he has started to feel few on left side. \par He has lost weight/ subcut\par Monitor for now\par \par Anxiety\par Follows with Dr Vira Bentley (psych)\par Much better\par Has discontinued xanax\par Continues to take lexapro Lexapro 10mg \par Advised to dw Dr Bentley about tapering meds if he is feeling better\par \par Personal history of cancer\par Nonsmoker,social drinker\par Mother with breast cancer in her 70s, Maternal grandfather with lung cancer (heavy smoker)\par Genetic testing - [employer] 84 gene panel- negative\par \par Follow up in 3 months\par CBC, CMP, magnesium, phosphorus, TSH

## 2021-09-16 NOTE — HISTORY OF PRESENT ILLNESS
[de-identified] : Mr. Juwan Cheung is 60 year old male  here for initial consultation for recent diagnosed of  poorly differentiated carcinoma of tongue\par \par He has PMHx of thoracic aortic aneurysm,  GERD, non-smoker who initially presented with persistent constant phlegm for years that patient always had to clear his throat here and there but  phlegm started to get stuck in his throat over the last couple of months. Patient followed up with doctors who diagnosed with reflux. \par He saw  Dr. Germán Novak on 9/11/20 who started patient on Nexium  after fiberoptic exam. Symptoms did not improved and new fiberoptic exam 10/9/20 showed lingual tonsillar midline still very enlarged but less edema and erythema with large cyst measuring 3 cm in greatest dimension. \par \par 10/15/20 CT of neck - mass measuring 2.5 x 1.7 x2.4 cm the midline vallecular that is about the same density as his lingual tonsil tissue.  Multiple enlarged right level 2 Cervical LNs.\par 10/22/20 -  Patient was then had CT of neck and  2nd Microlaryngoscopy with biopsy of epiglottis/vallecular mass\par -POORLY DIFERRENTIATED CARCINOMA , stains strongly positive for pancytokeratin, p63, CK5/6, and P16. weakly positive for cytokeratin 7.\par \par Mother with breast cancer in her 70s, Maternal grandfather with lung cancer (heavy smoker)\par Nonsmoker,social drinker\par \par Patient reports of doing well, still has phlegm.\par No fevers chills night sweats\par No fatigue, tiredness, apathy\par No appetite loss or weight loss/weight gain\par No chest pain, shortness of breath, palpitation, dizziness\par No abdominal pain, nausea, vomiting, diarrhea, constipation\par No bright red blood per rectum, hematemesis or melena\par No hematuria, dysuria, frequency, urgency\par No headaches, visual changes, focal weakness, tingling, numbness\par \par Has seen Rad Onc.\par Has scheduled second opinion with Dr Jonathan Goldberg ([facility]), Dr Eben Chi ([facility])\par \par PET/CT (10/31/20)\par Base of tongue 2.6 cm SUV 16\par Right level 2 lymph node 1.8 cm x 1.2 cm SUV 13\par Right level 2b LN 1.5 cm, SUV 19\par No distant metastases\par \par He had right base of tongue tumor resection with Dr Nunez at [facility] (12/9/20)\par Surgical pathology- 3 cm unifocal, HPV associated invasive squamous cell carcinoma, with lymphoepithelial feature. \par Margin was positive and 1 out the 5 positive lymph nodes had extranodal extension\par No lymphovascular or perineural invasion was seen. \par IHC: p16 positive. \par Pathological staging: pT2 N2. \par \par Tinnitus has stopped\par  [de-identified] : He is seen today for follow up \par s/p C3 Cisplatin (1/12/21 - 2/23/21) \par Radiation - (1/11/2021 - 3/2/21)\par \par He feels almost back to normal. He is doing is activities as before his diagnosis of cancer\par \par His PET/CT was declined by his insurance\par Saw Dr Nunez (9/10/21)-> had laryngoscopy-> which was benign\par Was supposed to have MRI which he decided to stretch the time and do it in October\par He has a friend in [place] who owns a cancer center and will do his PET/CT for free January 2022\par \par Continues to have dry mouth which he is trying to work around it\par He has not lost any weight since last appointment\par He is eating better. \par Anxiety/ depression - Currently only taking Lexapro 10mg \par Has stopped Xanax\par \par Weight - 193 lbs -> 195 lbs -> 191 lbs. -> 186 lbs -> 181 lbs -> 177 lbs -> 168 lbs -> 168 lbs\par \par \par

## 2021-09-19 NOTE — HISTORY OF PRESENT ILLNESS
[FreeTextEntry1] : Juwan Cheung returns for follow up.  \par \par He was diagnosed carcinoma of the epiglottis/vallecula.  He is s/p surgical resection, radiation and chemotherapy.\par \par He denies cp, sob, pnd, orthopnea, edema, palp, or loc.\par \par He is active.  He is compliant with meds.\par \par EXSE 6/2020: nl lv sys fxn; indeterminant blank fxn; LVH; aortic root 4.2 cm; mild to mod MR; mild TR; 12:25 min Yuan; no ischemia by WM\par CPET 6/2020: ischemic myocardial dysfxn; 104% predicted peak VO2; good peak O2 pulse; low AT\par Blood work 3/2021: anemia\par \par Reviewed clinical hx in detail.\par \par Recommendations:\par 1. continue CV meds\par 2. EXSE/CPET\par 3. exercise\par 4. get records\par .

## 2021-09-19 NOTE — PHYSICAL EXAM
[General Appearance - Well Developed] : well developed [Normal Appearance] : normal appearance [Well Groomed] : well groomed [General Appearance - Well Nourished] : well nourished [No Deformities] : no deformities [General Appearance - In No Acute Distress] : no acute distress [Normal Conjunctiva] : the conjunctiva exhibited no abnormalities [Eyelids - No Xanthelasma] : the eyelids demonstrated no xanthelasmas [Normal Oral Mucosa] : normal oral mucosa [No Oral Cyanosis] : no oral cyanosis [No Oral Pallor] : no oral pallor [Normal Jugular Venous A Waves Present] : normal jugular venous A waves present [Normal Jugular Venous V Waves Present] : normal jugular venous V waves present [No Jugular Venous Klein A Waves] : no jugular venous klein A waves [Respiration, Rhythm And Depth] : normal respiratory rhythm and effort [Exaggerated Use Of Accessory Muscles For Inspiration] : no accessory muscle use [Auscultation Breath Sounds / Voice Sounds] : lungs were clear to auscultation bilaterally [Heart Rate And Rhythm] : heart rate and rhythm were normal [Heart Sounds] : normal S1 and S2 [FreeTextEntry1] : sys murmur [Abdomen Tenderness] : non-tender [Abdomen Soft] : soft [Abdomen Mass (___ Cm)] : no abdominal mass palpated [Abnormal Walk] : normal gait [Gait - Sufficient For Exercise Testing] : the gait was sufficient for exercise testing [Nail Clubbing] : no clubbing of the fingernails [Cyanosis, Localized] : no localized cyanosis [Petechial Hemorrhages (___cm)] : no petechial hemorrhages [Skin Color & Pigmentation] : normal skin color and pigmentation [] : no rash [No Venous Stasis] : no venous stasis [Skin Lesions] : no skin lesions [No Skin Ulcers] : no skin ulcer [No Xanthoma] : no  xanthoma was observed

## 2021-09-19 NOTE — DISCUSSION/SUMMARY
[Cardiomyopathy] : cardiomyopathy [Coronary Artery Disease] : coronary artery disease [Possible Cardiac Ischemia (Intermd Prob)] : possible cardiac ischemia (intermediate probability) [Dietary Modification] : dietary modification [Exercise Regimen] : an exercise regimen [Hyperlipidemia] : hyperlipidemia [Diet Modification] : diet modification [Exercise] : exercise [Mild Mitral Regurgitation] : mild mitral regurgitation [Moderate Mitral Regurgitation] : moderate mitral regurgitation [Left Ventricular Hypertrophy] : left ventricular hypertrophy [Compensated] : compensated [Sodium Restriction] : sodium restriction [Stable] : stable [None] : There are no changes in medication management [Exercise Rehab] : exercise rehabilitation [Patient] : the patient [de-identified] : TAA 4.2 cm [FreeTextEntry1] : TR - mild

## 2021-09-19 NOTE — REASON FOR VISIT
[Structural Heart and Valve Disease] : structural heart and valve disease [Hypertension] : hypertension [Carotid, Aortic and Peripheral Vascular Disease] : carotid, aortic and peripheral vascular disease [FreeTextEntry3] : Javier Rodríguez [Follow-Up - Clinic] : a clinic follow-up of [Coronary Artery Disease] : coronary artery disease [Hyperlipidemia] : hyperlipidemia [Mitral Regurgitation] : mitral regurgitation [Peripheral Vascular Disease] : peripheral vascular disease

## 2021-09-28 ENCOUNTER — RESULT REVIEW (OUTPATIENT)
Age: 61
End: 2021-09-28

## 2021-10-18 NOTE — ASSESSMENT
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 Poorly differentiated\par s/p right base of tongue tumor resection with Dr Nunez at [facility] (12/9/20)\par Pathological staging: pT2 N2. \par Positive margin and 1 out the 5 positive lymph nodes had extranodal extension\par \par Here for C2 cisplatin (1/12/21 - present) \par Radiation (1/11/21 - 2/23/21) - total 6000 cGy\par Labs reviewed, analyzed and discussed\par Agranulocytosis from chemotherapy and radiation- WBC 2.2, ANC>1. COntinue with the treatment. Advised to take GCSF Saturday night. His insurance approved Nivstym 480 mcg. \par Advised to monitor for fever and go to ED for any fever (T>100.2F\par Hiccups- Better with compazine 10 mg TID x 5 days post chemo,\par Nausea- Zofran PRN. Continue with decadron post chemo \par Tinnitus- likely from cisplatin. Lasted longer with C2. Explained that there is no medication to treat tinnitus per say. Can use xanax PRN. He understands. Wants to continue cisplatin as his goal is cure\par Dose reduce cisplatin to 40 mg/m2. \par Plan:\par Cisplatin 50 mg/m2 days 1,2; 21,22; 42,43\par Follow up with audiometry after every cisplatin dose. He wants to hold off as it would not change the management\par \par Bumps on the posterior scalp\par He reports the one around the midline has been there for "decades", he has started to feel few on left side. \par He has lost weight/ subcut\par Monitor for now\par \par Dysphagia \par Combination of radiation and mucositis from neutropenia\par Ccontinue with Magic mouthwash and Tramadol PRN\par Hydration 1000 cc NS PRN\par \par Anxiety\par Follows with Dr Vira Bentley (psych)\par -not improved with Lexapro 10mg and Xanax 0.5mg bid - advised to go up to Lexapro 20mg and follows up with Dr. Bentley. \par -Advised to see therapists as well and patient states he'll look into it. \par \par #constipation - improved with Miralaax\par \par #Personal history of cancer\par Nonsmoker,social drinker\par Mother with breast cancer in her 70s, Maternal grandfather with lung cancer (heavy smoker)\par Genetic testing - [employer] 84 gene panel- negative\par \par Follow up in 1 week\par CBC, CMP, magnesium, phosphorus Consent (Scalp)/Introductory Paragraph: The rationale for Mohs was explained to the patient and consent was obtained. The risks, benefits and alternatives to therapy were discussed in detail. Specifically, the risks of changes in hair growth pattern secondary to repair, infection, scarring, bleeding, prolonged wound healing, incomplete removal, allergy to anesthesia, nerve injury and recurrence were addressed. Prior to the procedure, the treatment site was clearly identified and confirmed by the patient. All components of Universal Protocol/PAUSE Rule completed.

## 2021-11-13 ENCOUNTER — RX RENEWAL (OUTPATIENT)
Age: 61
End: 2021-11-13

## 2021-11-23 ENCOUNTER — NON-APPOINTMENT (OUTPATIENT)
Age: 61
End: 2021-11-23

## 2021-12-14 ENCOUNTER — APPOINTMENT (OUTPATIENT)
Dept: HEART AND VASCULAR | Facility: CLINIC | Age: 61
End: 2021-12-14
Payer: COMMERCIAL

## 2021-12-14 VITALS
BODY MASS INDEX: 25.34 KG/M2 | HEIGHT: 70 IN | OXYGEN SATURATION: 100 % | DIASTOLIC BLOOD PRESSURE: 78 MMHG | WEIGHT: 177 LBS | SYSTOLIC BLOOD PRESSURE: 110 MMHG | TEMPERATURE: 98.2 F

## 2021-12-14 PROCEDURE — 94621 CARDIOPULM EXERCISE TESTING: CPT

## 2021-12-14 PROCEDURE — 94727 GAS DIL/WSHOT DETER LNG VOL: CPT

## 2021-12-14 PROCEDURE — 94010 BREATHING CAPACITY TEST: CPT | Mod: 59

## 2021-12-14 PROCEDURE — 94729 DIFFUSING CAPACITY: CPT

## 2021-12-16 ENCOUNTER — APPOINTMENT (OUTPATIENT)
Dept: HEART AND VASCULAR | Facility: CLINIC | Age: 61
End: 2021-12-16
Payer: COMMERCIAL

## 2021-12-16 VITALS
OXYGEN SATURATION: 98 % | HEIGHT: 70 IN | RESPIRATION RATE: 16 BRPM | HEART RATE: 56 BPM | WEIGHT: 174 LBS | DIASTOLIC BLOOD PRESSURE: 70 MMHG | SYSTOLIC BLOOD PRESSURE: 120 MMHG | BODY MASS INDEX: 24.91 KG/M2

## 2021-12-16 PROCEDURE — 93015 CV STRESS TEST SUPVJ I&R: CPT

## 2021-12-16 PROCEDURE — 93306 TTE W/DOPPLER COMPLETE: CPT

## 2021-12-16 RX ORDER — ESCITALOPRAM OXALATE 20 MG/1
20 TABLET, FILM COATED ORAL DAILY
Refills: 0 | Status: DISCONTINUED | COMMUNITY
Start: 2021-04-09 | End: 2021-12-16

## 2021-12-21 ENCOUNTER — NON-APPOINTMENT (OUTPATIENT)
Age: 61
End: 2021-12-21

## 2021-12-30 ENCOUNTER — APPOINTMENT (OUTPATIENT)
Dept: HEMATOLOGY ONCOLOGY | Facility: CLINIC | Age: 61
End: 2021-12-30
Payer: COMMERCIAL

## 2021-12-30 ENCOUNTER — APPOINTMENT (OUTPATIENT)
Dept: HEMATOLOGY ONCOLOGY | Facility: CLINIC | Age: 61
End: 2021-12-30

## 2021-12-30 ENCOUNTER — RESULT REVIEW (OUTPATIENT)
Age: 61
End: 2021-12-30

## 2021-12-30 VITALS
RESPIRATION RATE: 16 BRPM | WEIGHT: 176.1 LBS | TEMPERATURE: 97.1 F | BODY MASS INDEX: 25.21 KG/M2 | OXYGEN SATURATION: 98 % | HEIGHT: 70 IN | HEART RATE: 56 BPM | DIASTOLIC BLOOD PRESSURE: 63 MMHG | SYSTOLIC BLOOD PRESSURE: 117 MMHG

## 2021-12-30 PROCEDURE — 99214 OFFICE O/P EST MOD 30 MIN: CPT

## 2021-12-30 NOTE — ASSESSMENT
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 Poorly differentiated\par s/p right base of tongue tumor resection with Dr Nunez at [facility] (12/9/20)\par Pathological staging: pT2 N2. \par Positive margin and 1 out the 5 positive lymph nodes had extranodal extension\par s/p C3 Cisplatin (1/12/21 - 2/23/21) \par Radiation - (1/11/2021 - 3/2/21) - total 6000 cGy\par \par He is recovering well from the treatment\par Had laryngoscopic eval with ENT last week which showed DOMINGO\par MRI neck 9/2021- DOMINGO\par Scheduled for PET/CT in [place] in January 2022, but he is planning ot postpone it due to ongoing rise in COVID cases and his wife's chemo\par Labs reviewed, analyzed and discussed\par Agranulocytosis from chemotherapy and radiation vs vaccine- mostly lymphopenia. No intervention necessary\par \par Bumps on the posterior scalp\par He reports the one around the midline has been there for "decades", he has started to feel few on left side. \par He has lost weight/ subcut\par Monitor for now\par \par Anxiety\par Follows with Dr Vira Bentley (psych)\par Much better\par Has discontinued xanax\par Continues to take lexapro Lexapro 10mg \par Advised to dw Dr Bentley about tapering meds if he is feeling better\par \par Personal history of cancer\par Nonsmoker,social drinker\par Mother with breast cancer in her 70s, Maternal grandfather with lung cancer (heavy smoker)\par Genetic testing - [employer]itae 84 gene panel- negative\par \par Follow up in 3 months\par CBC, CMP, magnesium, phosphorus, TSH

## 2021-12-30 NOTE — CONSULT LETTER
[FreeTextEntry3] : Jeovany Khan MD, MPH\par Attending Physician\par Hematology Oncology\par [facility] [place]\par [facility]\par

## 2021-12-30 NOTE — HISTORY OF PRESENT ILLNESS
[de-identified] : Mr. Juwan Cheung is 60 year old male  here for initial consultation for recent diagnosed of  poorly differentiated carcinoma of tongue\par \par He has PMHx of thoracic aortic aneurysm,  GERD, non-smoker who initially presented with persistent constant phlegm for years that patient always had to clear his throat here and there but  phlegm started to get stuck in his throat over the last couple of months. Patient followed up with doctors who diagnosed with reflux. \par He saw  Dr. Germán Novak on 9/11/20 who started patient on Nexium  after fiberoptic exam. Symptoms did not improved and new fiberoptic exam 10/9/20 showed lingual tonsillar midline still very enlarged but less edema and erythema with large cyst measuring 3 cm in greatest dimension. \par \par 10/15/20 CT of neck - mass measuring 2.5 x 1.7 x2.4 cm the midline vallecular that is about the same density as his lingual tonsil tissue.  Multiple enlarged right level 2 Cervical LNs.\par 10/22/20 -  Patient was then had CT of neck and  2nd Microlaryngoscopy with biopsy of epiglottis/vallecular mass\par -POORLY DIFERRENTIATED CARCINOMA , stains strongly positive for pancytokeratin, p63, CK5/6, and P16. weakly positive for cytokeratin 7.\par \par Mother with breast cancer in her 70s, Maternal grandfather with lung cancer (heavy smoker)\par Nonsmoker,social drinker\par \par Patient reports of doing well, still has phlegm.\par No fevers chills night sweats\par No fatigue, tiredness, apathy\par No appetite loss or weight loss/weight gain\par No chest pain, shortness of breath, palpitation, dizziness\par No abdominal pain, nausea, vomiting, diarrhea, constipation\par No bright red blood per rectum, hematemesis or melena\par No hematuria, dysuria, frequency, urgency\par No headaches, visual changes, focal weakness, tingling, numbness\par \par Has seen Rad Onc.\par Has scheduled second opinion with Dr Jonathan Goldberg ([facility]), Dr Eben Chi ([facility])\par \par PET/CT (10/31/20)\par Base of tongue 2.6 cm SUV 16\par Right level 2 lymph node 1.8 cm x 1.2 cm SUV 13\par Right level 2b LN 1.5 cm, SUV 19\par No distant metastases\par \par He had right base of tongue tumor resection with Dr Nunez at [facility] (12/9/20)\par Surgical pathology- 3 cm unifocal, HPV associated invasive squamous cell carcinoma, with lymphoepithelial feature. \par Margin was positive and 1 out the 5 positive lymph nodes had extranodal extension\par No lymphovascular or perineural invasion was seen. \par IHC: p16 positive. \par Pathological staging: pT2 N2. \par \par Tinnitus has stopped\par  [de-identified] : He is seen today for follow up \par s/p C3 Cisplatin (1/12/21 - 2/23/21) \par Radiation - (1/11/2021 - 3/2/21)\par \par His wife got diagnosed with breast cancer and scheduled to begin chemo later this week\par \par He feels almost back to normal. He is doing is activities as before his diagnosis of cancer\par \par Continues to have dry mouth but better\par He is eating more and has gained weight\par Anxiety/ depression - Currently only taking Lexapro 10mg \par \par His PET/CT was declined by his insurance\par Saw Dr Nunez (12/21)-> had laryngoscopy-> which was benign\par MRI (9/2021) 0 No evidence of disease\par He has a friend in [place] who owns a cancer center and will do his PET/CT for free January 2022. But he plans to move it to February 2022\par \par Weight - 193 lbs -> 195 lbs -> 191 lbs. -> 186 lbs -> 181 lbs -> 177 lbs -> 168 lbs -> 168 lbs -> 176 lbs\par

## 2022-03-31 ENCOUNTER — APPOINTMENT (OUTPATIENT)
Dept: HEMATOLOGY ONCOLOGY | Facility: CLINIC | Age: 62
End: 2022-03-31
Payer: COMMERCIAL

## 2022-03-31 ENCOUNTER — RESULT REVIEW (OUTPATIENT)
Age: 62
End: 2022-03-31

## 2022-03-31 VITALS
TEMPERATURE: 97.2 F | OXYGEN SATURATION: 100 % | BODY MASS INDEX: 26.2 KG/M2 | RESPIRATION RATE: 18 BRPM | DIASTOLIC BLOOD PRESSURE: 56 MMHG | HEIGHT: 70 IN | SYSTOLIC BLOOD PRESSURE: 120 MMHG | WEIGHT: 183 LBS | HEART RATE: 55 BPM

## 2022-03-31 PROCEDURE — 99214 OFFICE O/P EST MOD 30 MIN: CPT | Mod: 25

## 2022-03-31 NOTE — HISTORY OF PRESENT ILLNESS
[de-identified] : Mr. Juwan Cheung is 60 year old male  here for initial consultation for recent diagnosed of  poorly differentiated carcinoma of tongue\par \par He has PMHx of thoracic aortic aneurysm,  GERD, non-smoker who initially presented with persistent constant phlegm for years that patient always had to clear his throat here and there but  phlegm started to get stuck in his throat over the last couple of months. Patient followed up with doctors who diagnosed with reflux. \par He saw  Dr. Germán Novak on 9/11/20 who started patient on Nexium  after fiberoptic exam. Symptoms did not improved and new fiberoptic exam 10/9/20 showed lingual tonsillar midline still very enlarged but less edema and erythema with large cyst measuring 3 cm in greatest dimension. \par \par 10/15/20 CT of neck - mass measuring 2.5 x 1.7 x2.4 cm the midline vallecular that is about the same density as his lingual tonsil tissue.  Multiple enlarged right level 2 Cervical LNs.\par 10/22/20 -  Patient was then had CT of neck and  2nd Microlaryngoscopy with biopsy of epiglottis/vallecular mass\par -POORLY DIFERRENTIATED CARCINOMA , stains strongly positive for pancytokeratin, p63, CK5/6, and P16. weakly positive for cytokeratin 7.\par \par Mother with breast cancer in her 70s, Maternal grandfather with lung cancer (heavy smoker)\par Nonsmoker,social drinker\par \par Patient reports of doing well, still has phlegm.\par No fevers chills night sweats\par No fatigue, tiredness, apathy\par No appetite loss or weight loss/weight gain\par No chest pain, shortness of breath, palpitation, dizziness\par No abdominal pain, nausea, vomiting, diarrhea, constipation\par No bright red blood per rectum, hematemesis or melena\par No hematuria, dysuria, frequency, urgency\par No headaches, visual changes, focal weakness, tingling, numbness\par \par Has seen Rad Onc.\par Has scheduled second opinion with Dr Jonathan Goldberg ([facility]), Dr Eben Chi ([facility])\par \par PET/CT (10/31/20)\par Base of tongue 2.6 cm SUV 16\par Right level 2 lymph node 1.8 cm x 1.2 cm SUV 13\par Right level 2b LN 1.5 cm, SUV 19\par No distant metastases\par \par He had right base of tongue tumor resection with Dr Nunez at [facility] (12/9/20)\par Surgical pathology- 3 cm unifocal, HPV associated invasive squamous cell carcinoma, with lymphoepithelial feature. \par Margin was positive and 1 out the 5 positive lymph nodes had extranodal extension\par No lymphovascular or perineural invasion was seen. \par IHC: p16 positive. \par Pathological staging: pT2 N2. \par \par Tinnitus has stopped\par \par His PET/CT was declined by his insurance\par Saw Dr Nunez (12/21)-> had laryngoscopy-> which was benign\par MRI (9/2021) 0 No evidence of disease\par  [de-identified] : He is seen today for follow up \par s/p C3 Cisplatin (1/12/21 - 2/23/21) \par Radiation - (1/11/2021 - 3/2/21)\par \par He feels good overall\par He is playing with 30 year old \par \par He is scheduled to see Dr Nunez next week\par He could not get PET CT as the center closed\par \par His wife who got diagnosed with breast cancer completed chemo and is now going for radiation\par \par Continues to have dry mouth but better\par He is eating more and has gained weight\par Anxiety/ depression - Currently only taking Lexapro 10mg \par \par Weight - 193 lbs -> 195 lbs -> 191 lbs. -> 186 lbs -> 181 lbs -> 177 lbs -> 168 lbs -> 168 lbs -> 176 lbs -> 183 lbs\par

## 2022-03-31 NOTE — ASSESSMENT
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 Poorly differentiated\par s/p right base of tongue tumor resection with Dr Nunez at [facility] (12/9/20)\par Pathological staging: pT2 N2. \par Positive margin and 1 out the 5 positive lymph nodes had extranodal extension\par s/p C3 Cisplatin (1/12/21 - 2/23/21) \par Radiation - (1/11/2021 - 3/2/21) - total 6000 cGy\par \par He is recovering well from the treatment\par Had laryngoscopic eval with ENT in December 2021 which showed DOMINGO\par MRI neck 9/2021- DOMINGO\par Has follow up with Dr Nunez next week\par PET/CT in [place] in January 2022 - could not be done and the center closed now\par Obtain CT neck chest abdomen and pelvis. Patient will call to discuss findings/results in a few days after the scan\par Labs reviewed, analyzed and discussed\par Agranulocytosis from chemotherapy and radiation vs vaccine- mostly lymphopenia. No intervention necessary\par \par Bumps on the posterior scalp\par He reports the one around the midline has been there for "decades", he has started to feel few on left side. \par He has lost weight/ subcut\par Monitor for now\par \par Anxiety\par Follows with Dr Vira Bentley (psych)\par Much better\par Has discontinued xanax\par Continues to take lexapro Lexapro 10mg \par Advised to petros Bentley about tapering meds if he is feeling better\par \par Personal history of cancer\par Nonsmoker,social drinker\par Mother with breast cancer in her 70s, Maternal grandfather with lung cancer (heavy smoker)\par Genetic testing - [employer] 84 gene panel- negative\par \par Follow up in 3 months\par CBC, CMP, magnesium, phosphorus, TSH

## 2022-03-31 NOTE — CONSULT LETTER
[FreeTextEntry3] : Jeovany Khan MD, MPH\par Attending Physician\par Hematology Oncology\par [facility]\par [facility]\par

## 2022-04-19 ENCOUNTER — RESULT REVIEW (OUTPATIENT)
Age: 62
End: 2022-04-19

## 2022-06-30 ENCOUNTER — RESULT REVIEW (OUTPATIENT)
Age: 62
End: 2022-06-30

## 2022-06-30 ENCOUNTER — APPOINTMENT (OUTPATIENT)
Dept: HEMATOLOGY ONCOLOGY | Facility: CLINIC | Age: 62
End: 2022-06-30

## 2022-06-30 VITALS
DIASTOLIC BLOOD PRESSURE: 61 MMHG | WEIGHT: 179 LBS | HEIGHT: 70 IN | TEMPERATURE: 97.9 F | BODY MASS INDEX: 25.62 KG/M2 | OXYGEN SATURATION: 99 % | SYSTOLIC BLOOD PRESSURE: 110 MMHG | RESPIRATION RATE: 18 BRPM | HEART RATE: 55 BPM

## 2022-06-30 PROCEDURE — 36415 COLL VENOUS BLD VENIPUNCTURE: CPT

## 2022-06-30 PROCEDURE — 99214 OFFICE O/P EST MOD 30 MIN: CPT | Mod: 25

## 2022-06-30 NOTE — ASSESSMENT
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 Poorly differentiated\par s/p right base of tongue tumor resection with Dr Nunez at [facility] (12/9/20)\par Pathological staging: pT2 N2. \par Positive margin and 1 out the 5 positive lymph nodes had extranodal extension\par s/p C3 Cisplatin (1/12/21 - 2/23/21) \par Radiation - (1/11/2021 - 3/2/21) - total 6000 cGy\par \par Patient is here for follow up\par clinically doing well, back to his baseline with playing sports weekly\par -Labs are drawn in the office, reviewed, analyzed, and discussed\par -he continues to follow up with ENT Dr. Nunez with laryngoscopic eval q3-4 months, last in 4/2022 which showed DOMINGO\par -4/2022 CT neck, C/A/P - DOMINGO\par -Persistent agranulocytosis from chemotherapy and radiation vs vaccine- mostly lymphopenia. No intervention necessary\par continue to monitor for now. \par \par # Anxiety\par Follows with Dr Vira Bentley (psych)\par Much better\par Has discontinued xanax\par Continues to take lexapro Lexapro 10mg \par Advised to petros Bentley about tapering meds if he is feeling better\par \par # Personal history of cancer\par Nonsmoker,social drinker\par Mother with breast cancer in her 70s, Maternal grandfather with lung cancer (heavy smoker)\par Genetic testing - [employer] 84 gene panel- negative\par \par Follow up in 3-4 months\par CBC, CMP, magnesium, phosphorus, TSH, free T4

## 2022-06-30 NOTE — CONSULT LETTER
[Dear  ___] : Dear  [unfilled], [Courtesy Letter:] : I had the pleasure of seeing your patient, [unfilled], in my office today. [Please see my note below.] : Please see my note below. [Consult Closing:] : Thank you very much for allowing me to participate in the care of this patient.  If you have any questions, please do not hesitate to contact me. [Sincerely,] : Sincerely, [FreeTextEntry3] : Jeovany Khan MD, MPH\par Attending Physician\par Hematology Oncology\par [facility] [place]\par [facility]\par

## 2022-06-30 NOTE — HISTORY OF PRESENT ILLNESS
[de-identified] : Mr. Juwan Cheung is 60 year old male  here for initial consultation for recent diagnosed of  poorly differentiated carcinoma of tongue\par \par He has PMHx of thoracic aortic aneurysm,  GERD, non-smoker who initially presented with persistent constant phlegm for years that patient always had to clear his throat here and there but  phlegm started to get stuck in his throat over the last couple of months. Patient followed up with doctors who diagnosed with reflux. \par He saw  Dr. Germán Novak on 9/11/20 who started patient on Nexium  after fiberoptic exam. Symptoms did not improved and new fiberoptic exam 10/9/20 showed lingual tonsillar midline still very enlarged but less edema and erythema with large cyst measuring 3 cm in greatest dimension. \par \par 10/15/20 CT of neck - mass measuring 2.5 x 1.7 x2.4 cm the midline vallecular that is about the same density as his lingual tonsil tissue.  Multiple enlarged right level 2 Cervical LNs.\par 10/22/20 -  Patient was then had CT of neck and  2nd Microlaryngoscopy with biopsy of epiglottis/vallecular mass\par -POORLY DIFERRENTIATED CARCINOMA , stains strongly positive for pancytokeratin, p63, CK5/6, and P16. weakly positive for cytokeratin 7.\par \par Mother with breast cancer in her 70s, Maternal grandfather with lung cancer (heavy smoker)\par Nonsmoker,social drinker\par \par Patient reports of doing well, still has phlegm.\par No fevers chills night sweats\par No fatigue, tiredness, apathy\par No appetite loss or weight loss/weight gain\par No chest pain, shortness of breath, palpitation, dizziness\par No abdominal pain, nausea, vomiting, diarrhea, constipation\par No bright red blood per rectum, hematemesis or melena\par No hematuria, dysuria, frequency, urgency\par No headaches, visual changes, focal weakness, tingling, numbness\par \par Has seen Rad Onc.\par Has scheduled second opinion with Dr Jonathan Goldberg ([facility]), Dr Eben Chi ([facility])\par \par PET/CT (10/31/20)\par Base of tongue 2.6 cm SUV 16\par Right level 2 lymph node 1.8 cm x 1.2 cm SUV 13\par Right level 2b LN 1.5 cm, SUV 19\par No distant metastases\par \par He had right base of tongue tumor resection with Dr Nunez at [facility] (12/9/20)\par Surgical pathology- 3 cm unifocal, HPV associated invasive squamous cell carcinoma, with lymphoepithelial feature. \par Margin was positive and 1 out the 5 positive lymph nodes had extranodal extension\par No lymphovascular or perineural invasion was seen. \par IHC: p16 positive. \par Pathological staging: pT2 N2. \par \par Tinnitus has stopped\par \par His PET/CT was declined by his insurance\par Saw Dr Nunez (12/21)-> had laryngoscopy-> which was benign\par MRI (9/2021) 0 No evidence of disease\par  [de-identified] : He is seen today for follow up \par s/p C3 Cisplatin (1/12/21 - 2/23/21) \par Radiation - (1/11/2021 - 3/2/21)\par \par Patient reports of doing well and is very active, playing all different sports weekly \par He continues to follow up with ENT Dr. Ruffin every 3-4 months with normal examination. \par his wife is \par 4/2022 CT neck, C/A/P - DOMINGO\par \par \par Weight - 193 lbs -> 195 lbs -> 191 lbs. -> 186 lbs -> 181 lbs -> 177 lbs -> 168 lbs -> 168 lbs -> 176 lbs -> 183 lbs\par

## 2022-09-13 ENCOUNTER — APPOINTMENT (OUTPATIENT)
Dept: HEART AND VASCULAR | Facility: CLINIC | Age: 62
End: 2022-09-13

## 2022-09-13 VITALS
OXYGEN SATURATION: 97 % | BODY MASS INDEX: 25.91 KG/M2 | WEIGHT: 181 LBS | SYSTOLIC BLOOD PRESSURE: 110 MMHG | DIASTOLIC BLOOD PRESSURE: 80 MMHG | TEMPERATURE: 97.8 F | HEART RATE: 54 BPM | HEIGHT: 70 IN | RESPIRATION RATE: 16 BRPM

## 2022-09-13 PROCEDURE — 99215 OFFICE O/P EST HI 40 MIN: CPT

## 2022-09-13 RX ORDER — SODIUM FLUORIDE 1.1 G/100G
1.1 GEL ORAL
Qty: 56 | Refills: 0 | Status: DISCONTINUED | COMMUNITY
Start: 2020-11-05 | End: 2022-09-13

## 2022-09-13 RX ORDER — ESCITALOPRAM OXALATE 20 MG/1
20 TABLET ORAL
Qty: 90 | Refills: 0 | Status: DISCONTINUED | COMMUNITY
Start: 2021-02-20 | End: 2022-09-13

## 2022-09-14 NOTE — REASON FOR VISIT
[Structural Heart and Valve Disease] : structural heart and valve disease [Hyperlipidemia] : hyperlipidemia [Hypertension] : hypertension [Coronary Artery Disease] : coronary artery disease [Carotid, Aortic and Peripheral Vascular Disease] : carotid, aortic and peripheral vascular disease [Parent] : parent [FreeTextEntry3] : Javier Rodríguez

## 2022-09-14 NOTE — HISTORY OF PRESENT ILLNESS
[FreeTextEntry1] : Juwan Cheung returns for follow up.  \par \par He was diagnosed carcinoma of the epiglottis/vallecula.  He is s/p surgical resection, radiation and chemotherapy.\par \par He denies cp, sob, pnd, orthopnea, edema, palp, or loc.\par \par He is active.  He is compliant with meds.\par \par EXSE 6/2020: nl lv sys fxn; indeterminant blank fxn; LVH; aortic root 4.2 cm; mild to mod MR; mild TR; 12:25 min Yuan; no ischemia by WM\par CPET 6/2020: ischemic myocardial dysfxn; 104% predicted peak VO2; good peak O2 pulse; low AT\par Blood work 3/2021: anemia\par Treadmill ECG 12/2021: nl; 13:04 min Yuan\par TTE 12/2021: nl lv sys fxn; nl blank fxn; GLS -17%; LVH; mild MR/TR\par CPET 12/2021: ischemia myocardial dysfxn; 100% predicted peak VO2\par \par Reviewed clinical hx and results in detail.\par \par Recommendations:\par 1. continue CV meds\par 2. exercise\par 3. f/u in 6 months\par

## 2022-09-14 NOTE — DISCUSSION/SUMMARY
[Cardiomyopathy] : cardiomyopathy [Coronary Artery Disease] : coronary artery disease [Possible Cardiac Ischemia (Intermd Prob)] : possible cardiac ischemia (intermediate probability) [Dietary Modification] : dietary modification [Exercise Regimen] : an exercise regimen [Hyperlipidemia] : hyperlipidemia [Diet Modification] : diet modification [Exercise] : exercise [Mild Mitral Regurgitation] : mild mitral regurgitation [Left Ventricular Hypertrophy] : left ventricular hypertrophy [Compensated] : compensated [Sodium Restriction] : sodium restriction [Stable] : stable [None] : There are no changes in medication management [Exercise Rehab] : exercise rehabilitation [Patient] : the patient [de-identified] : LVH; GLS -17% [de-identified] : TAA 4.2 cm [FreeTextEntry1] : TR - mild

## 2022-10-13 ENCOUNTER — RESULT REVIEW (OUTPATIENT)
Age: 62
End: 2022-10-13

## 2022-10-13 ENCOUNTER — APPOINTMENT (OUTPATIENT)
Dept: HEMATOLOGY ONCOLOGY | Facility: CLINIC | Age: 62
End: 2022-10-13

## 2022-10-13 VITALS
BODY MASS INDEX: 25.78 KG/M2 | RESPIRATION RATE: 16 BRPM | TEMPERATURE: 97.8 F | WEIGHT: 180.1 LBS | SYSTOLIC BLOOD PRESSURE: 124 MMHG | OXYGEN SATURATION: 98 % | DIASTOLIC BLOOD PRESSURE: 72 MMHG | HEIGHT: 70 IN | HEART RATE: 56 BPM

## 2022-10-13 PROCEDURE — 36415 COLL VENOUS BLD VENIPUNCTURE: CPT

## 2022-10-13 PROCEDURE — 99214 OFFICE O/P EST MOD 30 MIN: CPT | Mod: 25

## 2022-10-13 NOTE — HISTORY OF PRESENT ILLNESS
[de-identified] : Mr. Juwan Cheung is 60 year old male  here for initial consultation for recent diagnosed of  poorly differentiated carcinoma of tongue\par \par He has PMHx of thoracic aortic aneurysm,  GERD, non-smoker who initially presented with persistent constant phlegm for years that patient always had to clear his throat here and there but  phlegm started to get stuck in his throat over the last couple of months. Patient followed up with doctors who diagnosed with reflux. \par He saw  Dr. Germán Novak on 9/11/20 who started patient on Nexium  after fiberoptic exam. Symptoms did not improved and new fiberoptic exam 10/9/20 showed lingual tonsillar midline still very enlarged but less edema and erythema with large cyst measuring 3 cm in greatest dimension. \par \par 10/15/20 CT of neck - mass measuring 2.5 x 1.7 x2.4 cm the midline vallecular that is about the same density as his lingual tonsil tissue.  Multiple enlarged right level 2 Cervical LNs.\par 10/22/20 -  Patient was then had CT of neck and  2nd Microlaryngoscopy with biopsy of epiglottis/vallecular mass\par -POORLY DIFERRENTIATED CARCINOMA , stains strongly positive for pancytokeratin, p63, CK5/6, and P16. weakly positive for cytokeratin 7.\par \par Mother with breast cancer in her 70s, Maternal grandfather with lung cancer (heavy smoker)\par Nonsmoker,social drinker\par \par Patient reports of doing well, still has phlegm.\par No fevers chills night sweats\par No fatigue, tiredness, apathy\par No appetite loss or weight loss/weight gain\par No chest pain, shortness of breath, palpitation, dizziness\par No abdominal pain, nausea, vomiting, diarrhea, constipation\par No bright red blood per rectum, hematemesis or melena\par No hematuria, dysuria, frequency, urgency\par No headaches, visual changes, focal weakness, tingling, numbness\par \par Has seen Rad Onc.\par Has scheduled second opinion with Dr Jonathan Goldberg ([facility]), Dr Eben Chi ([facility])\par \par PET/CT (10/31/20)\par Base of tongue 2.6 cm SUV 16\par Right level 2 lymph node 1.8 cm x 1.2 cm SUV 13\par Right level 2b LN 1.5 cm, SUV 19\par No distant metastases\par \par He had right base of tongue tumor resection with Dr Nunez at [facility] (12/9/20)\par Surgical pathology- 3 cm unifocal, HPV associated invasive squamous cell carcinoma, with lymphoepithelial feature. \par Margin was positive and 1 out the 5 positive lymph nodes had extranodal extension\par No lymphovascular or perineural invasion was seen. \par IHC: p16 positive. \par Pathological staging: pT2 N2. \par \par Tinnitus has stopped\par \par His PET/CT was declined by his insurance\par Saw Dr Nunez (12/21)-> had laryngoscopy-> which was benign\par MRI (9/2021) 0 No evidence of disease\par \par 4/2022 CT neck, C/A/P - DOMINGO\par  [de-identified] : He is seen today for follow up \par s/p C3 Cisplatin (1/12/21 - 2/23/21) \par Radiation - (1/11/2021 - 3/2/21)\par \par Overall he feels good\par Salivation still not 100% but 75% back to normal\par Patient reports of doing well and is very active, playing all different sports weekly \par He continues to follow up with ENT Dr. Ruffin every 3-4 months with normal examination. Has appointment end of December 2022 and going to have MRI prior to the visit\par \par Weight - 193 lbs -> 195 lbs -> 191 lbs. -> 186 lbs -> 181 lbs -> 177 lbs -> 168 lbs -> 168 lbs -> 176 lbs -> 183 lbs -> 180 lbs\par

## 2022-10-13 NOTE — ASSESSMENT
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 Poorly differentiated\par s/p right base of tongue tumor resection with Dr Nunez at [facility] (12/9/20)\par Pathological staging: pT2 N2. \par Positive margin and 1 out the 5 positive lymph nodes had extranodal extension\par s/p C3 Cisplatin (1/12/21 - 2/23/21) \par Radiation - (1/11/2021 - 3/2/21) - total 6000 cGy\par \par Patient is here for follow up\par clinically doing well, back to his baseline with playing sports weekly\par -Labs are drawn in the office, reviewed, analyzed, and discussed\par he continues to follow up with ENT Dr. Nunez with laryngoscopic eval q3-4 months, last in 4/2022 which showed DOMINGO\par Has appointment end of December 2022 and going to have MRI prior to the visit\par -Persistent agranulocytosis from chemotherapy and radiation vs vaccine- mostly lymphopenia. No intervention necessary\par continue to monitor for now. \par Advised to be uptodate with age appropriate Cancer screening\par Colonoscopy scheduled November 2022\par \par Anxiety\par Follows with Dr Vira Bentley (psych)\par Much better\par Has discontinued xanax\par Continues to take lexapro Lexapro 10mg \par Advised to dw Dr Bentley about tapering meds if he is feeling better\par \par Personal history of cancer\par Nonsmoker,social drinker\par Mother with breast cancer in her 70s, Maternal grandfather with lung cancer (heavy smoker)\par Genetic testing - [employer] 84 gene panel- negative\par \par Follow up in 6 months\par CBC, CMP, magnesium, phosphorus, TSH, free T4

## 2022-10-13 NOTE — CONSULT LETTER
[Dear  ___] : Dear  [unfilled], [Courtesy Letter:] : I had the pleasure of seeing your patient, [unfilled], in my office today. [Please see my note below.] : Please see my note below. [Consult Closing:] : Thank you very much for allowing me to participate in the care of this patient.  If you have any questions, please do not hesitate to contact me. [Sincerely,] : Sincerely, [FreeTextEntry3] : Jeovany Khan MD, MPH\par Attending Physician\par Hematology Oncology\par [facility]\par [facility]\par

## 2023-01-01 NOTE — PHYSICAL EXAM
The patient is Stable - Low risk of patient condition declining or worsening    Shift Goals  Clinical Goals: Maintain stable VS and establish breastfeeding    Progress made toward(s) clinical / shift goals:      Problem: Potential for Hypothermia Related to Thermoregulation  Goal:  will maintain body temperature between 97.6 degrees axillary F and 99.6 degrees axillary F in an open crib  Outcome: Progressing  Infant regulating temperature independently bundled and in open crib.      Problem: Potential for Impaired Gas Exchange  Goal: [place] will not exhibit signs/symptoms of respiratory distress  Outcome: Progressing  Infant not displaying nasal flaring, grunting, or retractions. No s/s observed or reported.     Patient is not progressing towards the following goals:       [Normal] : oriented to person, place and time, the affect was normal, the mood was normal and not anxious [de-identified] : thrush present on laryngoscopy in office

## 2023-03-21 ENCOUNTER — APPOINTMENT (OUTPATIENT)
Dept: HEART AND VASCULAR | Facility: CLINIC | Age: 63
End: 2023-03-21

## 2023-04-04 ENCOUNTER — NON-APPOINTMENT (OUTPATIENT)
Age: 63
End: 2023-04-04

## 2023-04-04 ENCOUNTER — APPOINTMENT (OUTPATIENT)
Dept: HEART AND VASCULAR | Facility: CLINIC | Age: 63
End: 2023-04-04
Payer: COMMERCIAL

## 2023-04-04 VITALS
WEIGHT: 183 LBS | DIASTOLIC BLOOD PRESSURE: 78 MMHG | OXYGEN SATURATION: 98 % | HEIGHT: 70 IN | BODY MASS INDEX: 26.2 KG/M2 | SYSTOLIC BLOOD PRESSURE: 118 MMHG | RESPIRATION RATE: 15 BRPM | TEMPERATURE: 97.6 F | HEART RATE: 50 BPM

## 2023-04-04 PROCEDURE — 93000 ELECTROCARDIOGRAM COMPLETE: CPT

## 2023-04-04 PROCEDURE — 99215 OFFICE O/P EST HI 40 MIN: CPT | Mod: 25

## 2023-04-09 NOTE — HISTORY OF PRESENT ILLNESS
[FreeTextEntry1] : Juwan Cheung returns for follow up.  \par \par He denies cp, sob, pnd, orthopnea, edema, palp, or loc.\par \par He is active.  He is compliant with meds.\par \par ECG today reveals sinus bradycardia, 1st degree AV delay\par \par EXSE 6/2020: nl lv sys fxn; indeterminant blank fxn; LVH; aortic root 4.2 cm; mild to mod MR; mild TR; 12:25 min Yuan; no ischemia by WM\par CPET 6/2020: ischemic myocardial dysfxn; 104% predicted peak VO2; good peak O2 pulse; low AT\par Blood work 3/2021: anemia\par Treadmill ECG 12/2021: nl; 13:04 min Yuan\par TTE 12/2021: nl lv sys fxn; nl blank fxn; GLS -17%; LVH; mild MR/TR\par CPET 12/2021: ischemia myocardial dysfxn; 100% predicted peak VO2\par \par Reviewed clinical hx  in detail.\par \par Recommendations:\par 1. continue CV meds\par 2. exercise\par 3. EXSE\par 4. CPET\par \par

## 2023-04-09 NOTE — DISCUSSION/SUMMARY
[Cardiomyopathy] : cardiomyopathy [Coronary Artery Disease] : coronary artery disease [Possible Cardiac Ischemia (Intermd Prob)] : possible cardiac ischemia (intermediate probability) [Dietary Modification] : dietary modification [Exercise Regimen] : an exercise regimen [Hyperlipidemia] : hyperlipidemia [Diet Modification] : diet modification [Exercise] : exercise [Mild Mitral Regurgitation] : mild mitral regurgitation [Left Ventricular Hypertrophy] : left ventricular hypertrophy [Compensated] : compensated [Sodium Restriction] : sodium restriction [Stable] : stable [None] : There are no changes in medication management [Exercise Rehab] : exercise rehabilitation [Patient] : the patient [de-identified] : LVH; GLS -17% [de-identified] : TAA 4.2 cm [FreeTextEntry1] : TR - mild  [EKG obtained to assist in diagnosis and management of assessed problem(s)] : EKG obtained to assist in diagnosis and management of assessed problem(s)

## 2023-04-09 NOTE — REASON FOR VISIT
[Structural Heart and Valve Disease] : structural heart and valve disease [Hyperlipidemia] : hyperlipidemia [Hypertension] : hypertension [Coronary Artery Disease] : coronary artery disease [Carotid, Aortic and Peripheral Vascular Disease] : carotid, aortic and peripheral vascular disease [FreeTextEntry3] : Javier Rodríguez

## 2023-04-09 NOTE — PHYSICAL EXAM
[General Appearance - Well Developed] : well developed [Normal Appearance] : normal appearance [General Appearance - Well Nourished] : well nourished [Well Groomed] : well groomed [No Deformities] : no deformities [General Appearance - In No Acute Distress] : no acute distress [Normal Conjunctiva] : the conjunctiva exhibited no abnormalities [Eyelids - No Xanthelasma] : the eyelids demonstrated no xanthelasmas [Normal Oral Mucosa] : normal oral mucosa [No Oral Pallor] : no oral pallor [No Oral Cyanosis] : no oral cyanosis [Normal Jugular Venous A Waves Present] : normal jugular venous A waves present [Normal Jugular Venous V Waves Present] : normal jugular venous V waves present [No Jugular Venous Klein A Waves] : no jugular venous klein A waves [Exaggerated Use Of Accessory Muscles For Inspiration] : no accessory muscle use [Respiration, Rhythm And Depth] : normal respiratory rhythm and effort [Auscultation Breath Sounds / Voice Sounds] : lungs were clear to auscultation bilaterally [Heart Rate And Rhythm] : heart rate and rhythm were normal [Heart Sounds] : normal S1 and S2 [FreeTextEntry1] : sys murmur [Abdomen Soft] : soft [Abdomen Tenderness] : non-tender [Abdomen Mass (___ Cm)] : no abdominal mass palpated [Gait - Sufficient For Exercise Testing] : the gait was sufficient for exercise testing [Abnormal Walk] : normal gait [Nail Clubbing] : no clubbing of the fingernails [Cyanosis, Localized] : no localized cyanosis [Petechial Hemorrhages (___cm)] : no petechial hemorrhages [Skin Color & Pigmentation] : normal skin color and pigmentation [] : no rash [No Venous Stasis] : no venous stasis [No Skin Ulcers] : no skin ulcer [Skin Lesions] : no skin lesions [No Xanthoma] : no  xanthoma was observed

## 2023-04-20 ENCOUNTER — RESULT REVIEW (OUTPATIENT)
Age: 63
End: 2023-04-20

## 2023-04-20 ENCOUNTER — APPOINTMENT (OUTPATIENT)
Dept: HEMATOLOGY ONCOLOGY | Facility: CLINIC | Age: 63
End: 2023-04-20
Payer: COMMERCIAL

## 2023-04-20 VITALS
BODY MASS INDEX: 26.26 KG/M2 | HEART RATE: 63 BPM | OXYGEN SATURATION: 99 % | WEIGHT: 183.4 LBS | HEIGHT: 70 IN | SYSTOLIC BLOOD PRESSURE: 123 MMHG | RESPIRATION RATE: 16 BRPM | DIASTOLIC BLOOD PRESSURE: 75 MMHG | TEMPERATURE: 97.7 F

## 2023-04-20 DIAGNOSIS — C32.1 MALIGNANT NEOPLASM OF SUPRAGLOTTIS: ICD-10-CM

## 2023-04-20 PROCEDURE — 99214 OFFICE O/P EST MOD 30 MIN: CPT | Mod: 25

## 2023-04-21 PROBLEM — C32.1: Status: ACTIVE | Noted: 2020-11-02

## 2023-04-21 NOTE — ASSESSMENT
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 Poorly differentiated\par s/p right base of tongue tumor resection with Dr Nunez at [facility] (12/9/20)\par Pathological staging: pT2 N2. \par Positive margin and 1 out the 5 positive lymph nodes had extranodal extension\par s/p C3 Cisplatin (1/12/21 - 2/23/21) \par Radiation - (1/11/2021 - 3/2/21) - total 6000 cGy\par \par Patient is here for follow up\par clinically doing well, back to his baseline with playing sports weekly\par -Labs are drawn in the office, reviewed, analyzed, and discussed\par he continues to follow up with ENT Dr. Nunez with laryngoscopic eval q3-4 months, last in 3/2023 which showed DOMINGO\par Last MRI 12/23 was normal. Records not available at this time. Will try to obtain\par Has appointment in July 2023 with Dr Nunez\par Advised to be uptodate with age appropriate Cancer screening\par \par Anxiety\par Follows with Dr Vira Bentley (psych)\par Much better\par Has discontinued xanax and lexapro and feels great\par \par Personal history of cancer\par Nonsmoker,social drinker\par Mother with breast cancer in her 70s, Maternal grandfather with lung cancer (heavy smoker)\par Genetic testing - [employer] 84 gene panel- negative\par \par Follow up in 6 months\par CBC, CMP, magnesium, phosphorus, TSH, free T4

## 2023-04-21 NOTE — CONSULT LETTER
[Dear  ___] : Dear  [unfilled], [Courtesy Letter:] : I had the pleasure of seeing your patient, [unfilled], in my office today. [Please see my note below.] : Please see my note below. [Consult Closing:] : Thank you very much for allowing me to participate in the care of this patient.  If you have any questions, please do not hesitate to contact me. [Sincerely,] : Sincerely, [FreeTextEntry3] : Jeovany Khan MD, MPH\par Attending Physician\par Hematology Oncology\par [facility] [place]\par [facility]\par

## 2023-04-21 NOTE — HISTORY OF PRESENT ILLNESS
[de-identified] : Mr. Juwan Cheung is 60 year old male  here for initial consultation for recent diagnosed of  poorly differentiated carcinoma of tongue\par \par He has PMHx of thoracic aortic aneurysm,  GERD, non-smoker who initially presented with persistent constant phlegm for years that patient always had to clear his throat here and there but  phlegm started to get stuck in his throat over the last couple of months. Patient followed up with doctors who diagnosed with reflux. \par He saw  Dr. Germán Novak on 9/11/20 who started patient on Nexium  after fiberoptic exam. Symptoms did not improved and new fiberoptic exam 10/9/20 showed lingual tonsillar midline still very enlarged but less edema and erythema with large cyst measuring 3 cm in greatest dimension. \par \par 10/15/20 CT of neck - mass measuring 2.5 x 1.7 x2.4 cm the midline vallecular that is about the same density as his lingual tonsil tissue.  Multiple enlarged right level 2 Cervical LNs.\par 10/22/20 -  Patient was then had CT of neck and  2nd Microlaryngoscopy with biopsy of epiglottis/vallecular mass\par -POORLY DIFERRENTIATED CARCINOMA , stains strongly positive for pancytokeratin, p63, CK5/6, and P16. weakly positive for cytokeratin 7.\par \par Mother with breast cancer in her 70s, Maternal grandfather with lung cancer (heavy smoker)\par Nonsmoker,social drinker\par \par Patient reports of doing well, still has phlegm.\par No fevers chills night sweats\par No fatigue, tiredness, apathy\par No appetite loss or weight loss/weight gain\par No chest pain, shortness of breath, palpitation, dizziness\par No abdominal pain, nausea, vomiting, diarrhea, constipation\par No bright red blood per rectum, hematemesis or melena\par No hematuria, dysuria, frequency, urgency\par No headaches, visual changes, focal weakness, tingling, numbness\par \par Has seen Rad Onc.\par Has scheduled second opinion with Dr Jonathan Goldberg ([facility]), Dr Eben Chi ([facility])\par \par PET/CT (10/31/20)\par Base of tongue 2.6 cm SUV 16\par Right level 2 lymph node 1.8 cm x 1.2 cm SUV 13\par Right level 2b LN 1.5 cm, SUV 19\par No distant metastases\par \par He had right base of tongue tumor resection with Dr Nunez at [facility] (12/9/20)\par Surgical pathology- 3 cm unifocal, HPV associated invasive squamous cell carcinoma, with lymphoepithelial feature. \par Margin was positive and 1 out the 5 positive lymph nodes had extranodal extension\par No lymphovascular or perineural invasion was seen. \par IHC: p16 positive. \par Pathological staging: pT2 N2. \par \par Tinnitus has stopped\par \par His PET/CT was declined by his insurance\par Saw Dr Nunez (12/21)-> had laryngoscopy-> which was benign\par MRI (9/2021) 0 No evidence of disease\par \par 4/2022 CT neck, C/A/P - DOMINGO\par  [de-identified] : He is seen today for follow up \par s/p C3 Cisplatin (1/12/21 - 2/23/21) \par Radiation - (1/11/2021 - 3/2/21)\par \par Overall he feels good and is better than his precancer diagnosis\par He remains active and has continued to maintain the weight after his treatment and this has helped his athletic abilities\par He continues to follow up with ENT Dr. Ruffin every 3-4 months with normal examination. \par Last exam was 3/23/23 which was normal\par Last MRI was 12/2022 which was nromal. Records not available at this time. Will try to obtain\par \par He is now off of xanax and lexapro\par \par Weight - 193 lbs -> 195 lbs -> 191 lbs. -> 186 lbs -> 181 lbs -> 177 lbs -> 168 lbs -> 168 lbs -> 176 lbs -> 183 lbs -> 180 lbs -> 183 lbs\par

## 2023-05-18 ENCOUNTER — APPOINTMENT (OUTPATIENT)
Dept: RADIATION ONCOLOGY | Facility: CLINIC | Age: 63
End: 2023-05-18
Payer: COMMERCIAL

## 2023-05-18 VITALS
HEART RATE: 80 BPM | OXYGEN SATURATION: 99 % | RESPIRATION RATE: 18 BRPM | DIASTOLIC BLOOD PRESSURE: 80 MMHG | SYSTOLIC BLOOD PRESSURE: 133 MMHG

## 2023-05-18 PROCEDURE — 99214 OFFICE O/P EST MOD 30 MIN: CPT

## 2023-05-23 NOTE — HISTORY OF PRESENT ILLNESS
[FreeTextEntry1] : Mr. Cheung is a 60 year old male, diagnosed with a clinical stage I , T2N1 p16+ base of tongue poorly differentiated carcinoma status post resection (pT2 N2 - Stage II) s/p adjuvant concurrent chemoradiation and present today for his routine follow-up. \par \par Mr. Cheung initially presented with a chief complaint of constant phlegm for many years, evaluation with fiberoptic exam on 10/9/20, reported improved signs of reflux but persistent very enlarged lingual tonsillar midline with less edema, with a large 3 cm cyst. Further evaluation with CT NECK W/ IC (10/13/20) demonstrated a 2.5 x1.7 x2.4cm soft tissue mass in the right vallecula and multiple pathologically enlarged right level 2 cervical lymph nodes. \par \par On 10/20/20, he underwent a microlaryngoscopy with biopsy of the epiglottis/vallecular mass performed by Dr. Novak. Pathology revealed poorly differentiated carcinoma, stains were strongly positive for pancytokeratin, p63, CK5/6, and P16 and were focally weakly positive for cytokeratin 7. \par \par Staging PET/CT (10/31/20) demonstrated FDG activity at the base of tongue (2.6 cm, SUV 16.3), centered at the right base of tongue and crossed midline. There was FDG activity corresponding with the large right L2 lymph nodes, the largest 1.8 cm, with SUV 13.1. A small. mildly FDG avid right level 2b LN was also present, measuring 1.5 cm with SUV 1.9. No other evidence of metastatic disease was demonstrated. \par \par On 12/9/20 Mr. Cheung underwent a right base of tongue tumor resection  and neck dissection performed by Dr. Chi. Surgical pathology revealed a 3 cm unifocal, HPV associated invasive squamous cell carcinoma, with lymphoepithelial feature. There was tumor present at the true deep cauterized margin. No lymphovascular or perineural invasion was seen. There was right neck involvement at level 2 and 3 with 5 / 28 lymph nodes positive for metastatic carcinoma, . IHC: p16 positive. Pathological staging: pT2 N2. Mr. Cheung tolerated the procedure well. \par \par He received concurrent chemoradiation with Cisplatin x 3 cycle and radiation to the oropharynx/neck to a total dose of 66 Gy, which he completed on 3/2/21.  Since completion xerostomia has significantly improved. His weight is stable at 183 pounds. MRI from 11/1/22 of the orbits/face demonstrated posttreatment changes with edema in the right neck.  No pathologically enlarged lymph nodes.  Mr. Cheung has had a more recent scan, official report unavailable at this time.  Mr. Cheung is exercising and playing multiple spotrts.  He denies any fatigue or shortness of breath. He had follow-up with Dr. Khan in April and Dr. Chi on 3/23/23 and is scheduled again in July.

## 2023-05-23 NOTE — DISEASE MANAGEMENT
[Pathological] : TNM Stage: p [II] : II [FreeTextEntry4] : Base of Tongue poorly differentiated carcinoma [TTNM] : 2 [NTNM] : 2 [MTNM] : 0 [de-identified] : 6600 cGy [de-identified] : 6600 cGy [de-identified] : oropharynx/Neck - completed

## 2023-06-05 NOTE — REVIEW OF SYSTEMS
Occupational Therapy    Visit Type: treatment  SUBJECTIVE  Patient agreed to participate in therapy this date.  Patient verbally agrees to allow the following to be present during session: spouse    I need help with the leg      OBJECTIVE     Cognitive Status   Level of Consciousness   - alert  Affect/Behavior    - cooperative and calm  Orientation    - Oriented to: person, place, time and situation  Functional Communication   - Overall Status: within functional limits  Following Direction   - follows all commands and directions consistently  Safety Awareness/Insight   - decreased awareness of need for safety  Awareness of Deficits   - decreased awareness of deficits    Patient Activity Tolerance: 1 to 1 activity to rest       Sitting Balance  (RIOS = base of support)  Static      - Trial 1 details: supervision and with back unsupported    Standing Balance  (RIOS = base of support)  Firm Surface: Double Leg      - Static, Eyes Open       - Trial 1 details: contact guard, minimal assist, with double UE support, with tactile cues and with verbal cues       Bed Mobility  - Supine to sit: minimal assist, with tactile cues, with verbal cues (HOB slightly raised)  Transfers  Assistive devices: gait belt, 2-wheeled walker  - Sit to stand: minimal assist, with tactile cues, with verbal cues  - Stand to sit: minimal assist, with tactile cues, with verbal cues      Activities of Daily Living (ADLs)  Grooming/Oral Hygiene:   - Grooming assist: set up  - Position: chair  Toileting:   - Toilet transfer:        - Assist: minimal assist, with tactile cues and with verbal cues       - Device: gait belt and 2-wheeled walker       - Equipment: grab bar use  - Assist: maximal assist (assist for complete hygiene care)  - Assist needed for: steadying, increased time to complete and supervision/safety    Patient required increased time, cues and standing rest breaks to and from bathroom during session. Patient able to ambulated with  rolling walker and contact guard assist to minimal assist level.    Interventions      Additional exercise details:   bilateral upper extremities ROM exercises as able for future adl prep. Patient and  noted h/o arthritis and baseline limited shoulder ROM in right upper extremity.     Training provided: activity tolerance, ADL training, balance retraining, energy conservation, HEP training, positioning, safety training, transfer training and use of adaptive equipment  Skilled input: verbal instruction/cues, tactile instruction/cues and posture correction  Verbal Consent: Writer verbally educated and received verbal consent for hand placement, positioning of patient, and techniques to be performed today from patient for clothing adjustments for techniques and therapist position for techniques as described above and how they are pertinent to the patient's plan of care.         Education:   - Present and ready to learn: patient  Education provided during session:  - Results of above outlined education: Verbalizes understanding, Demonstrates understanding and Needs reinforcement    ASSESSMENT   Progress: progressing toward goals    Summary of function and discharge needs based on today's assessment:  - Current level of function: significantly below baseline level of function  - Therapy needs at discharge: therapy 5 or more times per week  - Activities of daily living (ADLs) requiring support at discharge: dressing and toileting  - Impairments that require further therapy intervention: pain, ROM, strength and activity tolerance  AM-PAC  - Prior Level of Function: IND/MOD I ([facility] 22-24)       Key: MOD A=moderate assistance, IND/MOD I=independent/modified independent  - Generalized Current Level of Function     - Current Self-Cares: 17       Scoring Key= >21 Modified Independent; 20-21 Supervision; 18-19 Minimal assist; 13-18 Moderate assist; 9-12 Max assist; <9 Total assist    Therapy Diagnosis:   Decreased  ability to perform self care tasks and functional transfers.        PLAN  Suggestions for next session as indicated: A minimum of 8 minutes per session x 1 week in the acute setting.    OT Frequency: 3-5 x per week      PT/OT Mobility Equipment for Discharge: ABIGAIL  PT/OT ADL Equipment for Discharge: TBD- shower chair? bsc? reacher/sock aid?  Agreement to plan and goals: patient agrees with goals and treatment plan      GOALS  Review Date: 6/2/2023  Long Term Goals: (to be met by time of discharge from hospital)  Grooming: Patient will complete grooming tasks in standing stand by assist.  Status: progressing/ongoing  Lower body dressing: Patient will complete lower body dressing stand by assist. Status: progressing/ongoing  Toileting: Patient will complete toileting stand by assist.  Status: progressing/ongoing  Toilet transfer: Patient will complete toilet transfer with stand by assist. Status: progressing/ongoing        Documented in the chart in the following areas: Assessment/Plan.    Patient at End of Session:   Location: in chair  Safety measures: alarm system in place/re-engaged and call light within reach  Handoff to: nurse and family/caregiver      Therapy procedure time and total treatment time can be found documented on the Time Entry flowsheet   [Dysphagia: Grade 0] : Dysphagia: Grade 0 [Fatigue: Grade 0] : Fatigue: Grade 0 [FreeTextEntry7] : very mild throat discomfort

## 2023-07-25 ENCOUNTER — APPOINTMENT (OUTPATIENT)
Dept: HEART AND VASCULAR | Facility: CLINIC | Age: 63
End: 2023-07-25
Payer: COMMERCIAL

## 2023-07-25 VITALS
SYSTOLIC BLOOD PRESSURE: 118 MMHG | DIASTOLIC BLOOD PRESSURE: 70 MMHG | HEIGHT: 70 IN | BODY MASS INDEX: 26.2 KG/M2 | WEIGHT: 183 LBS | OXYGEN SATURATION: 98 % | HEART RATE: 57 BPM

## 2023-07-25 PROCEDURE — 93015 CV STRESS TEST SUPVJ I&R: CPT

## 2023-07-25 PROCEDURE — 93306 TTE W/DOPPLER COMPLETE: CPT

## 2023-07-26 ENCOUNTER — APPOINTMENT (OUTPATIENT)
Dept: HEART AND VASCULAR | Facility: CLINIC | Age: 63
End: 2023-07-26
Payer: COMMERCIAL

## 2023-07-26 VITALS
HEART RATE: 58 BPM | SYSTOLIC BLOOD PRESSURE: 128 MMHG | WEIGHT: 185 LBS | HEIGHT: 70 IN | DIASTOLIC BLOOD PRESSURE: 78 MMHG | BODY MASS INDEX: 26.48 KG/M2

## 2023-07-26 PROCEDURE — 94727 GAS DIL/WSHOT DETER LNG VOL: CPT

## 2023-07-26 PROCEDURE — 94010 BREATHING CAPACITY TEST: CPT | Mod: 59

## 2023-07-26 PROCEDURE — 94729 DIFFUSING CAPACITY: CPT

## 2023-07-26 PROCEDURE — 94621 CARDIOPULM EXERCISE TESTING: CPT

## 2023-07-27 ENCOUNTER — NON-APPOINTMENT (OUTPATIENT)
Age: 63
End: 2023-07-27

## 2023-08-01 ENCOUNTER — APPOINTMENT (OUTPATIENT)
Dept: HEART AND VASCULAR | Facility: CLINIC | Age: 63
End: 2023-08-01

## 2023-08-10 ENCOUNTER — APPOINTMENT (OUTPATIENT)
Dept: HEART AND VASCULAR | Facility: CLINIC | Age: 63
End: 2023-08-10
Payer: COMMERCIAL

## 2023-08-10 DIAGNOSIS — I25.5 ISCHEMIC CARDIOMYOPATHY: ICD-10-CM

## 2023-08-10 DIAGNOSIS — I07.1 RHEUMATIC TRICUSPID INSUFFICIENCY: ICD-10-CM

## 2023-08-10 PROCEDURE — 99448 NTRPROF PH1/NTRNET/EHR 21-30: CPT

## 2023-08-20 PROBLEM — I25.5 ISCHEMIC MYOCARDIAL DYSFUNCTION: Status: ACTIVE | Noted: 2020-06-18

## 2023-08-20 PROBLEM — I07.1 MILD TRICUSPID INSUFFICIENCY: Status: ACTIVE | Noted: 2020-06-18

## 2023-08-20 NOTE — HISTORY OF PRESENT ILLNESS
[FreeTextEntry1] : Juwan Cheung request telephone follow up.  Consent obtained and recorded.  He is at his work and doctor is in [place], [place].   He denies cp, sob, pnd, orthopnea, edema, palp, or loc.  He is active.  He is compliant with meds.  EXSE 6/2020: nl lv sys fxn; indeterminant blank fxn; LVH; aortic root 4.2 cm; mild to mod MR; mild TR; 12:25 min Yuan; no ischemia by WM CPET 6/2020: ischemic myocardial dysfxn; 104% predicted peak VO2; good peak O2 pulse; low AT Blood work 3/2021: anemia Treadmill ECG 12/2021: nl; 13:04 min Yuan TTE 12/2021: nl lv sys fxn; nl blank fxn; GLS -17%; LVH; mild MR/TR CPET 12/2021: ischemia myocardial dysfxn; 100% predicted peak VO2 TTE7/2023: nl lv sys fxn; nl blank fxn; mild to mod MR/TR; aortic root 4.4 cm Treadmill ECG 7/2023: 12:50 min Yuan; normal CPET 7/2023: ischemic myocardial dysfxn; 105% predicted peak VO2  Reviewed clinical hx and results reviewed in detail.  PE from 4/2023 eval.  Recommendations: 1. continue CV meds 2. exercise 3. f/u in 6 months

## 2023-08-20 NOTE — DISCUSSION/SUMMARY
[Cardiomyopathy] : cardiomyopathy [Coronary Artery Disease] : coronary artery disease [Possible Cardiac Ischemia (Intermd Prob)] : possible cardiac ischemia (intermediate probability) [Dietary Modification] : dietary modification [Exercise Regimen] : an exercise regimen [Hyperlipidemia] : hyperlipidemia [Diet Modification] : diet modification [Exercise] : exercise [Mild Mitral Regurgitation] : mild mitral regurgitation [Moderate Mitral Regurgitation] : moderate mitral regurgitation [Left Ventricular Hypertrophy] : left ventricular hypertrophy [Compensated] : compensated [Sodium Restriction] : sodium restriction [Stable] : stable [None] : There are no changes in medication management [Patient] : the patient [de-identified] : LVH; GLS -17% [de-identified] : TAA 4.4 cm [FreeTextEntry1] : TR - mild to mod

## 2023-09-29 ENCOUNTER — TRANSCRIPTION ENCOUNTER (OUTPATIENT)
Age: 63
End: 2023-09-29

## 2023-10-18 ENCOUNTER — RESULT REVIEW (OUTPATIENT)
Age: 63
End: 2023-10-18

## 2023-10-18 ENCOUNTER — APPOINTMENT (OUTPATIENT)
Dept: HEMATOLOGY ONCOLOGY | Facility: CLINIC | Age: 63
End: 2023-10-18
Payer: COMMERCIAL

## 2023-10-18 VITALS
OXYGEN SATURATION: 99 % | BODY MASS INDEX: 26.36 KG/M2 | TEMPERATURE: 97.9 F | WEIGHT: 184.13 LBS | HEIGHT: 70 IN | SYSTOLIC BLOOD PRESSURE: 122 MMHG | RESPIRATION RATE: 16 BRPM | DIASTOLIC BLOOD PRESSURE: 77 MMHG | HEART RATE: 57 BPM

## 2023-10-18 DIAGNOSIS — D70.1 AGRANULOCYTOSIS SECONDARY TO CANCER CHEMOTHERAPY: ICD-10-CM

## 2023-10-18 DIAGNOSIS — T45.1X5A AGRANULOCYTOSIS SECONDARY TO CANCER CHEMOTHERAPY: ICD-10-CM

## 2023-10-18 PROCEDURE — 99214 OFFICE O/P EST MOD 30 MIN: CPT

## 2023-12-24 ENCOUNTER — RX RENEWAL (OUTPATIENT)
Age: 63
End: 2023-12-24

## 2024-01-29 ENCOUNTER — APPOINTMENT (OUTPATIENT)
Dept: NEUROLOGY | Facility: CLINIC | Age: 64
End: 2024-01-29

## 2024-02-21 ENCOUNTER — APPOINTMENT (OUTPATIENT)
Dept: RADIATION ONCOLOGY | Facility: CLINIC | Age: 64
End: 2024-02-21
Payer: COMMERCIAL

## 2024-02-21 VITALS
WEIGHT: 188 LBS | TEMPERATURE: 97.4 F | OXYGEN SATURATION: 99 % | SYSTOLIC BLOOD PRESSURE: 134 MMHG | DIASTOLIC BLOOD PRESSURE: 81 MMHG | RESPIRATION RATE: 18 BRPM | HEART RATE: 53 BPM | HEIGHT: 70 IN | BODY MASS INDEX: 26.92 KG/M2

## 2024-02-21 PROCEDURE — 92511 NASOPHARYNGOSCOPY: CPT

## 2024-02-21 PROCEDURE — 99214 OFFICE O/P EST MOD 30 MIN: CPT | Mod: 25

## 2024-02-25 NOTE — HISTORY OF PRESENT ILLNESS
[FreeTextEntry1] : Mr. Cheung is a 60 year old male, diagnosed with a clinical stage I , T2N1 p16+ base of tongue poorly differentiated carcinoma status post resection (pT2 N2 - Stage II) s/p adjuvant concurrent chemoradiation and present today for his routine follow-up.   Mr. Cheung initially presented with a chief complaint of constant phlegm for many years, evaluation with fiberoptic exam on 10/9/20, reported improved signs of reflux but persistent very enlarged lingual tonsillar midline with less edema, with a large 3 cm cyst. Further evaluation with CT NECK W/ IC (10/13/20) demonstrated a 2.5 x1.7 x2.4cm soft tissue mass in the right vallecula and multiple pathologically enlarged right level 2 cervical lymph nodes.   On 10/20/20, he underwent a microlaryngoscopy with biopsy of the epiglottis/vallecular mass performed by Dr. Novak. Pathology revealed poorly differentiated carcinoma, stains were strongly positive for pancytokeratin, p63, CK5/6, and P16 and were focally weakly positive for cytokeratin 7.   Staging PET/CT (10/31/20) demonstrated FDG activity at the base of tongue (2.6 cm, SUV 16.3), centered at the right base of tongue and crossed midline. There was FDG activity corresponding with the large right L2 lymph nodes, the largest 1.8 cm, with SUV 13.1. A small. mildly FDG avid right level 2b LN was also present, measuring 1.5 cm with SUV 1.9. No other evidence of metastatic disease was demonstrated.   On 12/9/20 Mr. Cheung underwent a right base of tongue tumor resection and neck dissection performed by Dr. Chi. Surgical pathology revealed a 3 cm unifocal, HPV associated invasive squamous cell carcinoma, with lymphoepithelial feature. There was tumor present at the true deep cauterized margin. No lymphovascular or perineural invasion was seen. There was right neck involvement at level 2 and 3 with 5 / 28 lymph nodes positive for metastatic carcinoma, . IHC: p16 positive. Pathological staging: pT2 N2. Mr. Cheung tolerated the procedure well.   He received concurrent chemoradiation with Cisplatin x 3 cycle and radiation to the oropharynx/neck to a total dose of 66 Gy, which he completed on 3/2/21.    Mr. Cheung reports that he is doing very well. He has some xerostomia at night. He denies any other symptoms including sore throat, difficulty swallowing, change in taste, weight loss.  He had follow-up by Dr. Khan on 10/18/23 and is scheduled in April 2024.  He has follow-up with Dr. Chi in May 2024.  11/27/23 MRI Orbit - face and neck: post therapy changes otherwise normal.  Overall, he is doing well, he continues to workout regularly by playing basketball.

## 2024-02-25 NOTE — DISEASE MANAGEMENT
[Pathological] : TNM Stage: p [II] : II [FreeTextEntry4] : Base of Tongue poorly differentiated carcinoma [TTNM] : 2 [NTNM] : 2 [MTNM] : 0 [de-identified] : 6600 cGy [de-identified] : oropharynx/Neck - completed  [de-identified] : 6600 cGy

## 2024-02-25 NOTE — PHYSICAL EXAM
[Normal] : oriented to person, place and time, the affect was normal, the mood was normal and not anxious [de-identified] : healthy dentition

## 2024-02-25 NOTE — REVIEW OF SYSTEMS
[Negative] : Heme/Lymph [Dysphagia] : no dysphagia [FreeTextEntry4] : dry mouth  [Odynophagia] : no odynophagia

## 2024-04-23 ENCOUNTER — APPOINTMENT (OUTPATIENT)
Dept: HEMATOLOGY ONCOLOGY | Facility: CLINIC | Age: 64
End: 2024-04-23
Payer: COMMERCIAL

## 2024-04-23 ENCOUNTER — RESULT REVIEW (OUTPATIENT)
Age: 64
End: 2024-04-23

## 2024-04-23 VITALS
WEIGHT: 188.56 LBS | OXYGEN SATURATION: 99 % | SYSTOLIC BLOOD PRESSURE: 125 MMHG | DIASTOLIC BLOOD PRESSURE: 77 MMHG | HEART RATE: 59 BPM | HEIGHT: 70 IN | BODY MASS INDEX: 26.99 KG/M2 | TEMPERATURE: 98.8 F | RESPIRATION RATE: 16 BRPM

## 2024-04-23 DIAGNOSIS — C01 MALIGNANT NEOPLASM OF BASE OF TONGUE: ICD-10-CM

## 2024-04-23 PROCEDURE — 99213 OFFICE O/P EST LOW 20 MIN: CPT

## 2024-04-23 RX ORDER — METHYLPREDNISOLONE 4 MG/1
4 TABLET ORAL
Qty: 21 | Refills: 0 | Status: COMPLETED | COMMUNITY
Start: 2022-11-02 | End: 2024-04-23

## 2024-04-23 RX ORDER — ROSUVASTATIN CALCIUM 20 MG/1
20 TABLET, FILM COATED ORAL
Qty: 90 | Refills: 3 | Status: ACTIVE | COMMUNITY
Start: 2020-10-23 | End: 1900-01-01

## 2024-04-23 RX ORDER — CEFDINIR 300 MG/1
300 CAPSULE ORAL
Qty: 20 | Refills: 0 | Status: COMPLETED | COMMUNITY
Start: 2022-11-02 | End: 2024-04-23

## 2024-04-23 RX ORDER — ESCITALOPRAM OXALATE 10 MG/1
10 TABLET, FILM COATED ORAL
Refills: 0 | Status: ACTIVE | COMMUNITY

## 2024-04-23 RX ORDER — CLINDAMYCIN HYDROCHLORIDE 300 MG/1
300 CAPSULE ORAL
Qty: 20 | Refills: 0 | Status: COMPLETED | COMMUNITY
Start: 2023-01-05 | End: 2024-04-23

## 2024-04-23 RX ORDER — PREDNISONE 50 MG/1
50 TABLET ORAL
Qty: 5 | Refills: 0 | Status: COMPLETED | COMMUNITY
Start: 2023-01-05 | End: 2024-04-23

## 2024-04-29 ENCOUNTER — APPOINTMENT (OUTPATIENT)
Dept: HEART AND VASCULAR | Facility: CLINIC | Age: 64
End: 2024-04-29
Payer: COMMERCIAL

## 2024-04-29 VITALS
BODY MASS INDEX: 26.63 KG/M2 | RESPIRATION RATE: 16 BRPM | HEART RATE: 62 BPM | OXYGEN SATURATION: 98 % | WEIGHT: 186 LBS | HEIGHT: 70 IN

## 2024-04-29 DIAGNOSIS — I25.10 ATHEROSCLEROTIC HEART DISEASE OF NATIVE CORONARY ARTERY W/OUT ANGINA PECTORIS: ICD-10-CM

## 2024-04-29 DIAGNOSIS — I71.20 THORACIC AORTIC ANEURYSM, WITHOUT RUPTURE, UNSPECIFIED: ICD-10-CM

## 2024-04-29 DIAGNOSIS — I34.0 NONRHEUMATIC MITRAL (VALVE) INSUFFICIENCY: ICD-10-CM

## 2024-04-29 DIAGNOSIS — E78.5 HYPERLIPIDEMIA, UNSPECIFIED: ICD-10-CM

## 2024-04-29 DIAGNOSIS — I51.7 CARDIOMEGALY: ICD-10-CM

## 2024-04-29 PROCEDURE — 99214 OFFICE O/P EST MOD 30 MIN: CPT

## 2024-04-29 RX ORDER — MAGNESIUM OXIDE/MAG AA CHELATE 300 MG
CAPSULE ORAL
Refills: 0 | Status: ACTIVE | COMMUNITY

## 2024-04-29 RX ORDER — VITAMIN B COMPLEX
CAPSULE ORAL
Refills: 0 | Status: ACTIVE | COMMUNITY

## 2024-04-29 NOTE — REASON FOR VISIT
[Structural Heart and Valve Disease] : structural heart and valve disease [Hyperlipidemia] : hyperlipidemia [Hypertension] : hypertension [Carotid, Aortic and Peripheral Vascular Disease] : carotid, aortic and peripheral vascular disease

## 2024-04-29 NOTE — DISCUSSION/SUMMARY
[FreeTextEntry1] : stable exam, chart reviewed CAD stable, risk modification Lipids stable TAA/ MR- stable, echo on f/u

## 2024-10-21 ENCOUNTER — APPOINTMENT (OUTPATIENT)
Dept: HEART AND VASCULAR | Facility: CLINIC | Age: 64
End: 2024-10-21
Payer: COMMERCIAL

## 2024-10-21 ENCOUNTER — NON-APPOINTMENT (OUTPATIENT)
Age: 64
End: 2024-10-21

## 2024-10-21 VITALS
RESPIRATION RATE: 16 BRPM | DIASTOLIC BLOOD PRESSURE: 72 MMHG | HEIGHT: 70 IN | OXYGEN SATURATION: 98 % | BODY MASS INDEX: 27.2 KG/M2 | SYSTOLIC BLOOD PRESSURE: 130 MMHG | WEIGHT: 190 LBS | HEART RATE: 50 BPM

## 2024-10-21 DIAGNOSIS — I34.0 NONRHEUMATIC MITRAL (VALVE) INSUFFICIENCY: ICD-10-CM

## 2024-10-21 DIAGNOSIS — I07.1 RHEUMATIC TRICUSPID INSUFFICIENCY: ICD-10-CM

## 2024-10-21 DIAGNOSIS — I71.20 THORACIC AORTIC ANEURYSM, WITHOUT RUPTURE, UNSPECIFIED: ICD-10-CM

## 2024-10-21 DIAGNOSIS — E78.5 HYPERLIPIDEMIA, UNSPECIFIED: ICD-10-CM

## 2024-10-21 PROCEDURE — 99214 OFFICE O/P EST MOD 30 MIN: CPT

## 2024-10-21 PROCEDURE — 93306 TTE W/DOPPLER COMPLETE: CPT

## 2024-10-21 RX ORDER — LEVOTHYROXINE SODIUM 25 UG/1
25 CAPSULE ORAL DAILY
Refills: 0 | Status: ACTIVE | COMMUNITY

## 2024-10-23 NOTE — H&P ADULT - NSHPSOURCEINFORD_GEN_ALL_CORE
164/69 initially, 136/60 on recheck  Continue amlodipine 10, carvedilol 25 bid, hydralazine 10 bid, lisinopril 40.   Will try amlodipine over nifedipine currently (both were ordered, nifedipine cancelled).      Patient/Chart(s)

## 2025-01-11 ENCOUNTER — NON-APPOINTMENT (OUTPATIENT)
Age: 65
End: 2025-01-11

## 2025-04-21 ENCOUNTER — NON-APPOINTMENT (OUTPATIENT)
Age: 65
End: 2025-04-21

## 2025-04-22 ENCOUNTER — APPOINTMENT (OUTPATIENT)
Dept: HEMATOLOGY ONCOLOGY | Facility: CLINIC | Age: 65
End: 2025-04-22

## 2025-04-23 ENCOUNTER — APPOINTMENT (OUTPATIENT)
Dept: HEMATOLOGY ONCOLOGY | Facility: CLINIC | Age: 65
End: 2025-04-23
Payer: COMMERCIAL

## 2025-04-25 ENCOUNTER — APPOINTMENT (OUTPATIENT)
Dept: HEMATOLOGY ONCOLOGY | Facility: CLINIC | Age: 65
End: 2025-04-25
Payer: COMMERCIAL

## 2025-05-01 ENCOUNTER — RESULT REVIEW (OUTPATIENT)
Age: 65
End: 2025-05-01

## 2025-05-01 ENCOUNTER — APPOINTMENT (OUTPATIENT)
Dept: HEMATOLOGY ONCOLOGY | Facility: CLINIC | Age: 65
End: 2025-05-01
Payer: COMMERCIAL

## 2025-05-01 VITALS
WEIGHT: 194.25 LBS | HEIGHT: 70 IN | OXYGEN SATURATION: 99 % | DIASTOLIC BLOOD PRESSURE: 77 MMHG | HEART RATE: 56 BPM | RESPIRATION RATE: 16 BRPM | SYSTOLIC BLOOD PRESSURE: 111 MMHG | TEMPERATURE: 97.6 F | BODY MASS INDEX: 27.81 KG/M2

## 2025-05-01 DIAGNOSIS — C01 MALIGNANT NEOPLASM OF BASE OF TONGUE: ICD-10-CM

## 2025-05-01 PROCEDURE — 99214 OFFICE O/P EST MOD 30 MIN: CPT | Mod: 25

## 2025-05-27 ENCOUNTER — RX RENEWAL (OUTPATIENT)
Age: 65
End: 2025-05-27